# Patient Record
Sex: FEMALE | Race: WHITE | Employment: OTHER | URBAN - METROPOLITAN AREA
[De-identification: names, ages, dates, MRNs, and addresses within clinical notes are randomized per-mention and may not be internally consistent; named-entity substitution may affect disease eponyms.]

---

## 2019-04-11 PROBLEM — E44.0 MODERATE PROTEIN-CALORIE MALNUTRITION (HCC): Status: ACTIVE | Noted: 2019-04-11

## 2019-04-11 PROBLEM — K92.2 GI BLEED: Status: ACTIVE | Noted: 2019-04-11

## 2019-04-12 PROBLEM — E86.0 ACUTE DEHYDRATION: Status: ACTIVE | Noted: 2019-04-12

## 2019-04-12 PROBLEM — E87.0 ACUTE HYPERNATREMIA: Status: ACTIVE | Noted: 2019-04-12

## 2019-04-12 PROBLEM — G93.41 ACUTE METABOLIC ENCEPHALOPATHY: Status: ACTIVE | Noted: 2019-04-12

## 2019-04-12 PROBLEM — F11.90 CHRONIC, CONTINUOUS USE OF OPIOIDS: Status: ACTIVE | Noted: 2019-04-12

## 2019-04-12 PROBLEM — Z72.0 TOBACCO USE: Status: ACTIVE | Noted: 2019-04-12

## 2019-04-12 PROBLEM — M41.9 SEVERE SCOLIOSIS: Status: ACTIVE | Noted: 2019-04-12

## 2019-04-12 PROBLEM — G89.4 CHRONIC PAIN SYNDROME: Status: ACTIVE | Noted: 2019-04-12

## 2019-04-12 PROBLEM — M53.9 MULTILEVEL DEGENERATIVE DISC DISEASE: Status: ACTIVE | Noted: 2019-04-12

## 2019-04-13 PROBLEM — I95.9 HYPOTENSION: Status: ACTIVE | Noted: 2019-04-13

## 2019-04-13 PROBLEM — R41.82 ALTERED MENTAL STATUS: Status: ACTIVE | Noted: 2019-04-13

## 2019-06-27 PROBLEM — G92.8 TOXIC METABOLIC ENCEPHALOPATHY: Status: ACTIVE | Noted: 2019-04-12

## 2019-06-27 PROBLEM — F11.90 OPIATE USE: Chronic | Status: ACTIVE | Noted: 2019-06-27

## 2019-08-13 PROBLEM — G93.40 ACUTE ENCEPHALOPATHY: Status: ACTIVE | Noted: 2019-04-12

## 2022-11-15 ENCOUNTER — APPOINTMENT (OUTPATIENT)
Dept: GENERAL RADIOLOGY | Age: 62
End: 2022-11-15
Payer: MEDICARE

## 2022-11-15 ENCOUNTER — HOSPITAL ENCOUNTER (EMERGENCY)
Age: 62
Discharge: HOME | End: 2022-11-15
Attending: EMERGENCY MEDICINE
Payer: MEDICARE

## 2022-11-15 ENCOUNTER — APPOINTMENT (OUTPATIENT)
Dept: CT IMAGING | Age: 62
End: 2022-11-15
Payer: MEDICARE

## 2022-11-15 VITALS
HEART RATE: 95 BPM | DIASTOLIC BLOOD PRESSURE: 100 MMHG | TEMPERATURE: 98.2 F | RESPIRATION RATE: 18 BRPM | OXYGEN SATURATION: 95 % | HEIGHT: 61 IN | WEIGHT: 140 LBS | SYSTOLIC BLOOD PRESSURE: 153 MMHG | BODY MASS INDEX: 26.43 KG/M2

## 2022-11-15 DIAGNOSIS — M25.552 LEFT HIP PAIN: Primary | ICD-10-CM
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
## 2022-11-15 DIAGNOSIS — S72.002A LEFT DISPLACED FEMORAL NECK FRACTURE (HCC): ICD-10-CM

## 2022-11-15 PROCEDURE — 96372 THER/PROPH/DIAG INJ SC/IM: CPT

## 2022-11-15 PROCEDURE — 6360000002 HC RX W HCPCS: Performed by: EMERGENCY MEDICINE

## 2022-11-15 PROCEDURE — 73501 X-RAY EXAM HIP UNI 1 VIEW: CPT

## 2022-11-15 PROCEDURE — 6360000002 HC RX W HCPCS

## 2022-11-15 PROCEDURE — 99284 EMERGENCY DEPT VISIT MOD MDM: CPT

## 2022-11-15 PROCEDURE — 96374 THER/PROPH/DIAG INJ IV PUSH: CPT

## 2022-11-15 RX ORDER — MORPHINE SULFATE 4 MG/ML
8 INJECTION, SOLUTION INTRAMUSCULAR; INTRAVENOUS ONCE
Status: COMPLETED | OUTPATIENT
Start: 2022-11-15 | End: 2022-11-15

## 2022-11-15 RX ORDER — ACETAMINOPHEN 325 MG/1
650 TABLET ORAL ONCE
Status: DISCONTINUED | OUTPATIENT
Start: 2022-11-15 | End: 2022-11-15 | Stop reason: HOSPADM

## 2022-11-15 RX ORDER — MORPHINE SULFATE 8 MG/ML
8 INJECTION, SOLUTION INTRAMUSCULAR; INTRAVENOUS ONCE
Status: COMPLETED | OUTPATIENT
Start: 2022-11-15 | End: 2022-11-15

## 2022-11-15 RX ORDER — FENTANYL CITRATE 50 UG/ML
50 INJECTION, SOLUTION INTRAMUSCULAR; INTRAVENOUS ONCE
Status: DISCONTINUED | OUTPATIENT
Start: 2022-11-15 | End: 2022-11-15

## 2022-11-15 RX ADMIN — MORPHINE SULFATE 8 MG: 8 INJECTION, SOLUTION INTRAMUSCULAR; INTRAVENOUS at 19:47

## 2022-11-15 RX ADMIN — MORPHINE SULFATE 8 MG: 4 INJECTION, SOLUTION INTRAMUSCULAR; INTRAVENOUS at 16:45

## 2022-11-15 ASSESSMENT — ENCOUNTER SYMPTOMS
SORE THROAT: 0
NAUSEA: 0
TROUBLE SWALLOWING: 0
VOMITING: 0
DIARRHEA: 0
COUGH: 0
PHOTOPHOBIA: 0
ABDOMINAL PAIN: 0
SHORTNESS OF BREATH: 0
WHEEZING: 0
VOICE CHANGE: 0

## 2022-11-15 ASSESSMENT — PAIN DESCRIPTION - ORIENTATION
ORIENTATION: LEFT
ORIENTATION: LEFT

## 2022-11-15 ASSESSMENT — PAIN - FUNCTIONAL ASSESSMENT
PAIN_FUNCTIONAL_ASSESSMENT: 0-10
PAIN_FUNCTIONAL_ASSESSMENT: 0-10

## 2022-11-15 ASSESSMENT — PAIN DESCRIPTION - LOCATION
LOCATION: KNEE
LOCATION: HIP;KNEE

## 2022-11-15 ASSESSMENT — PAIN SCALES - GENERAL
PAINLEVEL_OUTOF10: 10

## 2022-11-15 NOTE — DISCHARGE INSTRUCTIONS
Return to ED if any worsening symptoms or alarming changes occur.   Recommend orthopaedic for left femoral neck fracture   Follow-up with [facility]

## 2022-11-15 NOTE — PROGRESS NOTES
[facility]    Referral received. [facility] had called [facility] last night and again this morning. Hannah Salazar states they are active with [facility]. This nurse call [facility] and they could not find patient in their system. thinkingphones and she confirmed they are [facility]. She thought coming into the hospital would be an automatic revocation. This nurse explained that is not the case. Reviewed home medications being 1ml (20mg) of morphine Q1, Dilaudid tabs 20mg Q3, and 200mcg of fentanyl patches. Explained this nurse will call [facility] hospice again and the process of transferring hospice services verse revocation. Discussed ER giving medications for comfort. Hannah Salazar is hopeful for hospice house for pain management and then back home with hospice. Spoke with Francisca Cook at [facility]. She was very familiar with patient. Spoke with [facility] management and obtained information needed to transfer hospice services. Called Francisca Cook back and she stated she would fax information needed. Fax never came through. Called jose back and she left for the night. [facility] unable to take patient into our care until we receive the documents. Called and updated  in [facility] and ER physician. Called Hannah Salazar and explained the situation to her. Honey plans to take Kristofer Livers back home and would like [facility] to see tomorrow once documents are received and send her to hospice house. Updated [facility] management.   Electronically signed by Jaimee Sadler RN on 11/15/2022 at 6:29 PM        Electronically signed by Jaimee Sadler RN on 11/15/2022 at 4:36 PM   [phone]

## 2022-11-15 NOTE — ED NOTES
PT refusing all labs and imaging stating she wants a hospice consult with [facility].      Nadine Ang, RN  11/15/22 8151

## 2022-11-16 PROBLEM — S72.002A DISPLACED FRACTURE OF LEFT FEMORAL NECK (HCC): Status: ACTIVE | Noted: 2022-11-16

## 2022-11-16 NOTE — ED PROVIDER NOTES
60y.o. year old female presenting to the emergency room with concerns of fall left hip pain. No LOC, no head injury. Reports that symptom's onset this morning . Worsen with  movement. Improves withnothing. Severity of 10, with radiation down left leg. Symptoms are constant in timing. Symptoms described as fall while trying to transfer from bed to wheelchair. associated symptoms of left knee and hip pain. Patient externally rotated on initial exam. Hx of lung cancer with metastasis to bone, currently at [facility]. Chief Complaint   Patient presents with    Sofía Jesus while transfering to a wheelchair. She reports she injured her left hip and knee. She states she does not want any testing done but wants a hospice consult with [facility]. Hip Pain       Review of Systems   Constitutional:  Negative for chills, fatigue and fever. HENT:  Negative for congestion, sore throat, trouble swallowing and voice change. Eyes:  Negative for photophobia and visual disturbance. Respiratory:  Negative for cough, shortness of breath and wheezing. Cardiovascular:  Negative for chest pain and palpitations. Gastrointestinal:  Negative for abdominal pain, diarrhea, nausea and vomiting. Genitourinary:  Negative for dysuria, frequency, hematuria and urgency. Musculoskeletal:  Positive for gait problem. Negative for arthralgias, neck pain and neck stiffness. Skin:  Negative for pallor and wound. Neurological:  Negative for dizziness, syncope and headaches. Psychiatric/Behavioral:  Negative for behavioral problems and confusion. The patient is nervous/anxious. Physical Exam  Vitals reviewed. Constitutional:       General: She is not in acute distress. HENT:      Head: Normocephalic. Right Ear: External ear normal.      Left Ear: External ear normal.      Mouth/Throat:      Pharynx: Oropharynx is clear. Eyes:      General:         Right eye: No discharge. Left eye: No discharge. Conjunctiva/sclera: Conjunctivae normal.   Cardiovascular:      Rate and Rhythm: Normal rate and regular rhythm. Pulses: Normal pulses. Dorsalis pedis pulses are 2+ on the right side and 2+ on the left side. Posterior tibial pulses are 2+ on the right side and 2+ on the left side. Heart sounds: No friction rub. Pulmonary:      Effort: Pulmonary effort is normal. No respiratory distress. Breath sounds: No stridor. No rhonchi. Abdominal:      General: There is no distension. Tenderness: There is no abdominal tenderness. There is no guarding or rebound. Musculoskeletal:         General: Tenderness and deformity present. Cervical back: Normal range of motion and neck supple. No rigidity or tenderness. Right hip: Deformity, tenderness and bony tenderness present. Decreased range of motion. Right foot: Normal pulse. Skin:     Coloration: Skin is not jaundiced. Findings: Bruising and erythema present. Neurological:      Mental Status: She is alert and oriented to person, place, and time. Sensory: No sensory deficit. Motor: No weakness. Psychiatric:         Mood and Affect: Mood normal.         Behavior: Behavior normal.        Procedures     XR HIP 1 VW W PELVIS LEFT   Final Result   Displaced left femoral neck fracture. MDM  Number of Diagnoses or Management Options  Fall, initial encounter  Left displaced femoral neck fracture ([address]  Left hip pain  Diagnosis management comments: 60year old female presenting to the ER with complaints of fall, left hip pain. - head trauma, - LOC. Blood thinners and hospice for metastatic lung cancer with bone mets. Patient agreeable to x-ray. Does not wish for any further testing. X-ray demonstrates displaced left femoral neck fracture. Spoke to patient and POA, offered orthopedic however patient not interested in any further medical treatment at this time. Patient would like to have hospice consult from [facility]. [facility] unable to place patient until the morning. Patient offered admission, however unwilling to be admitted at this time. Patient also unwilling to wait for ambulance, will go home by private vehicle and be picked up by hospice house in the morning. Patient alert and oriented x4 and understands the risks of leaving. Patient does not wish for any further medical treatment at this time. Patient does request pain medication prior to discharge. Patient's son to  patient. We will plan to discharge at this time with follow-up with [facility] tomorrow. Information for orthopedic given if patient chooses. ED Course as of 11/15/22 2305   Tue Nov 15, 2022   1841 Spoke to patient and JEANCARLOS Salazar and discussed left displaced femoral neck fracture, discussed orthopedic consult and blood work however patient and POA do not wish to pursue any further medical treatment at this time and would like to continue hospice measures. They would like to pursue [facility] and and [address] [place] [address]. [facility] unable to get paperwork today and will only be able to take patient in the morning. Patient and POA, offered admission however patient unwilling to stay at this time, would prefer to be discharged home. [facility] will  patient from her home in the morning. Patient unwilling to wait for ambulance and will go home by private vehicle with son. Patient request pain medications prior to leaving.   Patient is alert and oriented x4, and understanding risks would still like to be discharged with plan for hospice at hospice  the [facility] tomorrow [REBEKAH]      ED Course User Index  [REBEKAH] Kamilla Cruz DO        ED Course as of 11/15/22 2305   Tue Nov 15, 2022   Matilde 1979 Spoke to patient and JEANCARLOS Salazar and discussed left displaced femoral neck fracture, discussed orthopedic consult and blood work however patient and POA do not wish to pursue any further medical treatment at this time and would like to continue hospice measures. They would like to pursue hospice [facility] and and [address] [place] [address]. [facility] unable to get paperwork today and will only be able to take patient in the morning. Patient and POA, offered admission however patient unwilling to stay at this time, would prefer to be discharged home. [facility] will  patient from her home in the morning. Patient unwilling to wait for ambulance and will go home by private vehicle with son. Patient request pain medications prior to leaving. Patient is alert and oriented x4, and understanding risks would still like to be discharged with plan for hospice at [facility] tomorrow [REBEKAH]      ED Course User Index  [REBEKAH] Ho Souza DO       --------------------------------------------- PAST HISTORY ---------------------------------------------  Past Medical History:  has a past medical history of Opiate use, PRES (posterior reversible encephalopathy syndrome), Scoliosis, and Spinal stenosis. Past Surgical History:  has a past surgical history that includes Knee arthroscopy; Upper gastrointestinal endoscopy (N/A, 4/14/2019); Colonoscopy (N/A, 4/14/2019); and CT NEEDLE BIOPSY LUNG PERCUTANEOUS (5/12/2022). Social History:  reports that she has been smoking cigarettes. She has a 30.00 pack-year smoking history. She has never used smokeless tobacco. She reports that she does not drink alcohol and does not use drugs. Family History: family history includes Heart Surgery in her father. The patients home medications have been reviewed. Allergies: Lyrica [pregabalin]    -------------------------------------------------- RESULTS -------------------------------------------------  Labs:  No results found for this visit on 11/15/22.     Radiology:  XR HIP 1 VW W PELVIS LEFT   Final Result   Displaced left femoral neck fracture.             ------------------------- NURSING NOTES AND VITALS REVIEWED ---------------------------  Date / Time Roomed:  11/15/2022  3:01 PM  ED Bed Assignment:  01/01    The nursing notes within the ED encounter and vital signs as below have been reviewed. BP (!) 153/100   Pulse 95   Temp 98.2 °F (36.8 °C) (Oral)   Resp 18   Ht 5' 1\" (1.549 m)   Wt 140 lb (63.5 kg)   SpO2 95%   BMI 26.45 kg/m²   Oxygen Saturation Interpretation: Normal      ------------------------------------------ PROGRESS NOTES ------------------------------------------  I have spoken with the patient and discussed todays results, in addition to providing specific details for the plan of care and counseling regarding the diagnosis and prognosis. Their questions are answered at this time and they are agreeable with the plan. I discussed at length with them reasons for immediate return here for re evaluation. They will followup with primary care by calling their office tomorrow. --------------------------------- ADDITIONAL PROVIDER NOTES ---------------------------------  At this time the patient is without objective evidence of an acute process requiring hospitalization or inpatient management. They have remained hemodynamically stable throughout their entire ED visit and are stable for discharge with outpatient follow-up. The plan has been discussed in detail and they are aware of the specific conditions for emergent return, as well as the importance of follow-up. Discharge Medication List as of 11/15/2022  7:16 PM          Diagnosis:  1. Left hip pain    2. Left displaced femoral neck fracture ([address]    3. Fall, initial encounter        Disposition:  Patient's disposition: Discharge to home  Patient's condition is stable. Attending was present and available throughout encounter including all critical portions;  See Attending Note/Attestation for Final Plan       Ho Souza, DO  Resident  11/15/22 4143

## 2022-11-19 ENCOUNTER — HOSPITAL ENCOUNTER (INPATIENT)
Age: 62
LOS: 2 days | Discharge: HOSPICE | DRG: 522 | End: 2022-11-21
Attending: EMERGENCY MEDICINE | Admitting: INTERNAL MEDICINE
Payer: MEDICARE

## 2022-11-19 DIAGNOSIS — S72.002A CLOSED FRACTURE OF LEFT HIP, INITIAL ENCOUNTER (HCC): Primary | ICD-10-CM

## 2022-11-19 LAB
ABO/RH: NORMAL
ANION GAP SERPL CALCULATED.3IONS-SCNC: 12 MMOL/L (ref 7–16)
ANTIBODY SCREEN: NORMAL
BASOPHILS ABSOLUTE: 0.01 E9/L (ref 0–0.2)
BASOPHILS RELATIVE PERCENT: 0.1 % (ref 0–2)
BUN BLDV-MCNC: 25 MG/DL (ref 6–23)
CALCIUM SERPL-MCNC: 9.1 MG/DL (ref 8.6–10.2)
CHLORIDE BLD-SCNC: 96 MMOL/L (ref 98–107)
CO2: 32 MMOL/L (ref 22–29)
CREAT SERPL-MCNC: 0.9 MG/DL (ref 0.5–1)
EOSINOPHILS ABSOLUTE: 0 E9/L (ref 0.05–0.5)
EOSINOPHILS RELATIVE PERCENT: 0 % (ref 0–6)
GFR SERPL CREATININE-BSD FRML MDRD: >60 ML/MIN/1.73
GLUCOSE BLD-MCNC: 114 MG/DL (ref 74–99)
HCT VFR BLD CALC: 44.4 % (ref 34–48)
HEMOGLOBIN: 13.7 G/DL (ref 11.5–15.5)
IMMATURE GRANULOCYTES #: 0.04 E9/L
IMMATURE GRANULOCYTES %: 0.4 % (ref 0–5)
LYMPHOCYTES ABSOLUTE: 1.29 E9/L (ref 1.5–4)
LYMPHOCYTES RELATIVE PERCENT: 14.1 % (ref 20–42)
MAGNESIUM: 2.3 MG/DL (ref 1.6–2.6)
MCH RBC QN AUTO: 30.2 PG (ref 26–35)
MCHC RBC AUTO-ENTMCNC: 30.9 % (ref 32–34.5)
MCV RBC AUTO: 97.8 FL (ref 80–99.9)
MONOCYTES ABSOLUTE: 0.2 E9/L (ref 0.1–0.95)
MONOCYTES RELATIVE PERCENT: 2.2 % (ref 2–12)
NEUTROPHILS ABSOLUTE: 7.61 E9/L (ref 1.8–7.3)
NEUTROPHILS RELATIVE PERCENT: 83.2 % (ref 43–80)
PDW BLD-RTO: 15.7 FL (ref 11.5–15)
PLATELET # BLD: 263 E9/L (ref 130–450)
PMV BLD AUTO: 9.4 FL (ref 7–12)
POTASSIUM REFLEX MAGNESIUM: 3.2 MMOL/L (ref 3.5–5)
RBC # BLD: 4.54 E12/L (ref 3.5–5.5)
SODIUM BLD-SCNC: 140 MMOL/L (ref 132–146)
WBC # BLD: 9.2 E9/L (ref 4.5–11.5)

## 2022-11-19 PROCEDURE — 99222 1ST HOSP IP/OBS MODERATE 55: CPT | Performed by: FAMILY MEDICINE

## 2022-11-19 PROCEDURE — 86900 BLOOD TYPING SEROLOGIC ABO: CPT

## 2022-11-19 PROCEDURE — 85025 COMPLETE CBC W/AUTO DIFF WBC: CPT

## 2022-11-19 PROCEDURE — 2580000003 HC RX 258

## 2022-11-19 PROCEDURE — 86901 BLOOD TYPING SEROLOGIC RH(D): CPT

## 2022-11-19 PROCEDURE — 6360000002 HC RX W HCPCS: Performed by: INTERNAL MEDICINE

## 2022-11-19 PROCEDURE — 1200000000 HC SEMI PRIVATE

## 2022-11-19 PROCEDURE — 83735 ASSAY OF MAGNESIUM: CPT

## 2022-11-19 PROCEDURE — 93005 ELECTROCARDIOGRAM TRACING: CPT | Performed by: EMERGENCY MEDICINE

## 2022-11-19 PROCEDURE — 99285 EMERGENCY DEPT VISIT HI MDM: CPT

## 2022-11-19 PROCEDURE — 99223 1ST HOSP IP/OBS HIGH 75: CPT | Performed by: STUDENT IN AN ORGANIZED HEALTH CARE EDUCATION/TRAINING PROGRAM

## 2022-11-19 PROCEDURE — 80048 BASIC METABOLIC PNL TOTAL CA: CPT

## 2022-11-19 PROCEDURE — 6370000000 HC RX 637 (ALT 250 FOR IP)

## 2022-11-19 PROCEDURE — 86850 RBC ANTIBODY SCREEN: CPT

## 2022-11-19 RX ORDER — MIRTAZAPINE 15 MG/1
15 TABLET, FILM COATED ORAL NIGHTLY
Status: DISCONTINUED | OUTPATIENT
Start: 2022-11-19 | End: 2022-11-21 | Stop reason: HOSPADM

## 2022-11-19 RX ORDER — POTASSIUM CHLORIDE 20 MEQ/1
40 TABLET, EXTENDED RELEASE ORAL ONCE
Status: COMPLETED | OUTPATIENT
Start: 2022-11-19 | End: 2022-11-19

## 2022-11-19 RX ORDER — POLYETHYLENE GLYCOL 3350 17 G/17G
17 POWDER, FOR SOLUTION ORAL DAILY PRN
Status: DISCONTINUED | OUTPATIENT
Start: 2022-11-19 | End: 2022-11-21 | Stop reason: HOSPADM

## 2022-11-19 RX ORDER — MORPHINE SULFATE 2 MG/ML
2 INJECTION, SOLUTION INTRAMUSCULAR; INTRAVENOUS
Status: DISCONTINUED | OUTPATIENT
Start: 2022-11-19 | End: 2022-11-21 | Stop reason: HOSPADM

## 2022-11-19 RX ORDER — SODIUM CHLORIDE 9 MG/ML
INJECTION, SOLUTION INTRAVENOUS PRN
Status: DISCONTINUED | OUTPATIENT
Start: 2022-11-19 | End: 2022-11-21 | Stop reason: HOSPADM

## 2022-11-19 RX ORDER — ACETAMINOPHEN 325 MG/1
650 TABLET ORAL EVERY 6 HOURS PRN
Status: DISCONTINUED | OUTPATIENT
Start: 2022-11-19 | End: 2022-11-21 | Stop reason: HOSPADM

## 2022-11-19 RX ORDER — SENNA AND DOCUSATE SODIUM 50; 8.6 MG/1; MG/1
2 TABLET, FILM COATED ORAL 2 TIMES DAILY
Status: DISCONTINUED | OUTPATIENT
Start: 2022-11-19 | End: 2022-11-21 | Stop reason: HOSPADM

## 2022-11-19 RX ORDER — METOPROLOL SUCCINATE 50 MG/1
50 TABLET, EXTENDED RELEASE ORAL DAILY
Status: DISCONTINUED | OUTPATIENT
Start: 2022-11-20 | End: 2022-11-21 | Stop reason: HOSPADM

## 2022-11-19 RX ORDER — NICOTINE 21 MG/24HR
1 PATCH, TRANSDERMAL 24 HOURS TRANSDERMAL DAILY
Status: DISCONTINUED | OUTPATIENT
Start: 2022-11-19 | End: 2022-11-21 | Stop reason: HOSPADM

## 2022-11-19 RX ORDER — SODIUM CHLORIDE 0.9 % (FLUSH) 0.9 %
5-40 SYRINGE (ML) INJECTION EVERY 12 HOURS SCHEDULED
Status: DISCONTINUED | OUTPATIENT
Start: 2022-11-19 | End: 2022-11-21 | Stop reason: HOSPADM

## 2022-11-19 RX ORDER — ONDANSETRON 2 MG/ML
4 INJECTION INTRAMUSCULAR; INTRAVENOUS EVERY 6 HOURS PRN
Status: DISCONTINUED | OUTPATIENT
Start: 2022-11-19 | End: 2022-11-21 | Stop reason: HOSPADM

## 2022-11-19 RX ORDER — SODIUM CHLORIDE 0.9 % (FLUSH) 0.9 %
5-40 SYRINGE (ML) INJECTION PRN
Status: DISCONTINUED | OUTPATIENT
Start: 2022-11-19 | End: 2022-11-21 | Stop reason: HOSPADM

## 2022-11-19 RX ORDER — METHADONE HYDROCHLORIDE 5 MG/1
5 TABLET ORAL EVERY 8 HOURS SCHEDULED
Status: DISCONTINUED | OUTPATIENT
Start: 2022-11-19 | End: 2022-11-21 | Stop reason: HOSPADM

## 2022-11-19 RX ORDER — ONDANSETRON 4 MG/1
4 TABLET, ORALLY DISINTEGRATING ORAL EVERY 8 HOURS PRN
Status: DISCONTINUED | OUTPATIENT
Start: 2022-11-19 | End: 2022-11-21 | Stop reason: HOSPADM

## 2022-11-19 RX ORDER — ACETAMINOPHEN 650 MG/1
650 SUPPOSITORY RECTAL EVERY 6 HOURS PRN
Status: DISCONTINUED | OUTPATIENT
Start: 2022-11-19 | End: 2022-11-21 | Stop reason: HOSPADM

## 2022-11-19 RX ORDER — QUETIAPINE FUMARATE 25 MG/1
25 TABLET, FILM COATED ORAL EVERY 6 HOURS PRN
Status: DISCONTINUED | OUTPATIENT
Start: 2022-11-19 | End: 2022-11-21 | Stop reason: HOSPADM

## 2022-11-19 RX ADMIN — METHADONE HYDROCHLORIDE 5 MG: 5 TABLET ORAL at 18:36

## 2022-11-19 RX ADMIN — METHADONE HYDROCHLORIDE 5 MG: 5 TABLET ORAL at 21:37

## 2022-11-19 RX ADMIN — ALTEPLASE 2 MG: 2.2 INJECTION, POWDER, LYOPHILIZED, FOR SOLUTION INTRAVENOUS at 18:26

## 2022-11-19 RX ADMIN — SODIUM CHLORIDE, PRESERVATIVE FREE 10 ML: 5 INJECTION INTRAVENOUS at 21:41

## 2022-11-19 RX ADMIN — POTASSIUM CHLORIDE 40 MEQ: 1500 TABLET, EXTENDED RELEASE ORAL at 18:37

## 2022-11-19 RX ADMIN — DOCUSATE SODIUM 50 MG AND SENNOSIDES 8.6 MG 2 TABLET: 8.6; 5 TABLET, FILM COATED ORAL at 21:37

## 2022-11-19 RX ADMIN — MIRTAZAPINE 15 MG: 15 TABLET, FILM COATED ORAL at 21:38

## 2022-11-19 ASSESSMENT — ENCOUNTER SYMPTOMS
PHOTOPHOBIA: 0
CONSTIPATION: 0
NAUSEA: 0
DIARRHEA: 0
VOMITING: 0
CHEST TIGHTNESS: 0
WHEEZING: 0
TROUBLE SWALLOWING: 0
BLOOD IN STOOL: 0

## 2022-11-19 NOTE — ED PROVIDER NOTES
HPI:  11/19/22,   Time: 2:44 PM WANG Espino is a 60 y.o. female presenting to the ED for patient was sent from the hospice house for repair of a left hip fracture that is secondary to a pathologic fracture related to metastatic lung cancer is going to be repaired by Dr. Maggie Arellano, beginning a few days ago. The complaint has been persistent, severe in severity, and worsened by movement of her left leg. ROS:   Pertinent positives and negatives are stated within HPI, all other systems reviewed and are negative.  --------------------------------------------- PAST HISTORY ---------------------------------------------  Past Medical History:  has a past medical history of Displaced fracture of left femoral neck ([address] Opiate use, PRES (posterior reversible encephalopathy syndrome), Scoliosis, and Spinal stenosis. Past Surgical History:  has a past surgical history that includes Knee arthroscopy; Upper gastrointestinal endoscopy (N/A, 4/14/2019); Colonoscopy (N/A, 4/14/2019); and CT NEEDLE BIOPSY LUNG PERCUTANEOUS (5/12/2022). Social History:  reports that she has been smoking cigarettes. She has a 30.00 pack-year smoking history. She has never used smokeless tobacco. She reports that she does not drink alcohol and does not use drugs. Family History: family history includes Heart Surgery in her father. The patients home medications have been reviewed.     Allergies: Lyrica [pregabalin]    -------------------------------------------------- RESULTS -------------------------------------------------  All laboratory and radiology results have been personally reviewed by myself   LABS:  Results for orders placed or performed during the hospital encounter of 11/19/22   CBC with Auto Differential   Result Value Ref Range    WBC 9.2 4.5 - 11.5 E9/L    RBC 4.54 3.50 - 5.50 E12/L    Hemoglobin 13.7 11.5 - 15.5 g/dL    Hematocrit 44.4 34.0 - 48.0 %    MCV 97.8 80.0 - 99.9 fL    MCH 30.2 26.0 - 35.0 pg MCHC 30.9 (L) 32.0 - 34.5 %    RDW 15.7 (H) 11.5 - 15.0 fL    Platelets 913 857 - 837 E9/L    MPV 9.4 7.0 - 12.0 fL    Neutrophils % 83.2 (H) 43.0 - 80.0 %    Immature Granulocytes % 0.4 0.0 - 5.0 %    Lymphocytes % 14.1 (L) 20.0 - 42.0 %    Monocytes % 2.2 2.0 - 12.0 %    Eosinophils % 0.0 0.0 - 6.0 %    Basophils % 0.1 0.0 - 2.0 %    Neutrophils Absolute 7.61 (H) 1.80 - 7.30 E9/L    Immature Granulocytes # 0.04 E9/L    Lymphocytes Absolute 1.29 (L) 1.50 - 4.00 E9/L    Monocytes Absolute 0.20 0.10 - 0.95 E9/L    Eosinophils Absolute 0.00 (L) 0.05 - 0.50 E9/L    Basophils Absolute 0.01 0.00 - 0.20 E9/L       RADIOLOGY:  Interpreted by Radiologist.  No orders to display       ------------------------- NURSING NOTES AND VITALS REVIEWED ---------------------------   The nursing notes within the ED encounter and vital signs as below have been reviewed. /63   Pulse 72   Temp 98 °F (36.7 °C)   Resp 16   Ht 5' 1\" (1.549 m)   Wt 140 lb (63.5 kg)   SpO2 92%   BMI 26.45 kg/m²   Oxygen Saturation Interpretation: Normal      ---------------------------------------------------PHYSICAL EXAM--------------------------------------      Constitutional/General: Alert and oriented x3, well appearing, non toxic in NAD  Head: NC/AT  Eyes: PERRL, EOMI  Mouth: Oropharynx clear, handling secretions, no trismus  Neck: Supple, full ROM, no meningeal signs  Pulmonary: Lungs clear to auscultation bilaterally, no wheezes, rales, or rhonchi. Not in respiratory distress  Cardiovascular:  Regular rate and rhythm, no murmurs, gallops, or rubs. 2+ distal pulses  Abdomen: Soft, non tender, non distended,   Extremities: Moves all extremities x 4. Warm and well perfused shortened externally rotated tender left lower extremity  Skin: warm and dry without rash  Neurologic: GCS 15,  Psych: Normal Affect    EKG: This EKG is signed and interpreted by me.     Rate: 78  Rhythm: Sinus  Interpretation: no acute changes  Comparison: no previous EKG available      ------------------------------ ED COURSE/MEDICAL DECISION MAKING----------------------  Medications   sodium chloride flush 0.9 % injection 5-40 mL (has no administration in time range)   sodium chloride flush 0.9 % injection 5-40 mL (has no administration in time range)   0.9 % sodium chloride infusion (has no administration in time range)   ondansetron (ZOFRAN-ODT) disintegrating tablet 4 mg (has no administration in time range)     Or   ondansetron (ZOFRAN) injection 4 mg (has no administration in time range)   polyethylene glycol (GLYCOLAX) packet 17 g (has no administration in time range)   acetaminophen (TYLENOL) tablet 650 mg (has no administration in time range)     Or   acetaminophen (TYLENOL) suppository 650 mg (has no administration in time range)   nicotine (NICODERM CQ) 14 MG/24HR 1 patch (has no administration in time range)   methadone (DOLOPHINE) tablet 5 mg (has no administration in time range)   morphine (PF) injection 2 mg (has no administration in time range)   mirtazapine (REMERON) tablet 15 mg (has no administration in time range)   metoprolol succinate (TOPROL XL) extended release tablet 50 mg (has no administration in time range)   QUEtiapine (SEROQUEL) tablet 25 mg (has no administration in time range)   sennosides-docusate sodium (SENOKOT-S) 8.6-50 MG tablet 2 tablet (has no administration in time range)         Medical Decision Making:    Patient was discussed with Dr. Johnnie Goddard and discussed with Dr. Des Pa and will be admitted to the hospitalist service with plans for ORIF by Dr. Johnnie Goddard    Counseling: The emergency provider has spoken with the patient and discussed todays results, in addition to providing specific details for the plan of care and counseling regarding the diagnosis and prognosis.   Questions are answered at this time and they are agreeable with the plan.      --------------------------------- IMPRESSION AND DISPOSITION ---------------------------------    IMPRESSION  1.  Closed fracture of left hip, initial encounter ([facility]        DISPOSITION  Disposition: Admit to med/surg floor  Patient condition is serious                  Marcus Darnell MD  11/19/22 3174

## 2022-11-19 NOTE — CONSULTS
Department of Orthopedic Surgery  Attending Consult Note        Reason for Consult:  left hip pain  Requesting Physician:  Dr. Court Redd:  Left hip pain     History Obtained From:  patient    HISTORY OF PRESENT ILLNESS:                The patient is a 60 y.o. female who presents with left femoral neck fracture. She fell on the 15th and was seen in the emergency department. She was discharged from the hospital to hospice house. She currently has metastatic lung cancer. Palliative care was going to be pursued however she is in extreme pain with any transfers or movement in bed. For this reason I was contacted today about surgical management. I recommended presentation to the emergency department to discuss operative intervention. Patient's goals are comfort with transfers so she can go home. She denies any other injuries. She is excited to get her hip feeling better. .    Past Medical History:        Diagnosis Date    Displaced fracture of left femoral neck ([address] 11/16/2022    Opiate use 6/27/2019    PRES (posterior reversible encephalopathy syndrome) 6/27/2019    Clinically improving s/p acute toxic-metabolic encephalopathy, hypotension, caloric malnutrition state    Scoliosis     Spinal stenosis        Past Surgical History:        Procedure Laterality Date    COLONOSCOPY N/A 4/14/2019    COLONOSCOPY DIAGNOSTIC performed by Suni Marroquin MD at [address] [facility]  5/12/2022    CT NEEDLE BIOPSY LUNG PERCUTANEOUS 5/12/2022 [facility] CT    KNEE ARTHROSCOPY      UPPER GASTROINTESTINAL ENDOSCOPY N/A 4/14/2019    EGD ESOPHAGOGASTRODUODENOSCOPY performed by Suni Marroquin MD at [address] [place] [address]       Current Medications:   No current facility-administered medications for this encounter. Allergies:  Lyrica [pregabalin]    Social History:   TOBACCO:   reports that she has been smoking cigarettes. She has a 30.00 pack-year smoking history.  She has never used smokeless tobacco.  ETOH:   reports no history of alcohol use. DRUGS:   reports no history of drug use. Family History:       Problem Relation Age of Onset    Heart Surgery Father        REVIEW OF SYSTEMS:       Constitutional:  Negative for weight loss, fevers, chills, fatigue  Cardiovascular: Negative for chest pain, palpitations  Pulmonary: Negative for shortness of breath, labored breathing, cough  GI: negative for abdominal pain, nausea, vomitting   MSK: per HPI  Skin: negative for rash, open wounds    All other systems reviewed and are negative         PHYSICAL EXAM:      Vitals:    11/19/22 1419 11/19/22 1425   BP:  127/63   Pulse:  72   Resp:  16   Temp:  98 °F (36.7 °C)   SpO2:  92%   Weight: 140 lb (63.5 kg)    Height: 5' 1\" (1.549 m)        Height: [unfilled]  Weight: [unfilled]  BMI:  Body mass index is 26.45 kg/m². General: The patient is alert and oriented x 3, appears to be stated age and in no distress. HEENT: head is normocephalic, atraumatic. EOMI. Neck: supple, trachea midline, no thyromegaly   Cardiovascular: peripheral pulses palpable. Normal Capillary refill   Respiratory: breathing unlabored, chest expansion symmetric   GI: abdomen NT/ND. No masses   Skin: no rash, no open wounds, no erythema  Psych: normal affect; mood stable  MSK:  Left hip: Circumferentially skin is intact without any wounds or lesions. Her hip is held in a shortened and externally rotated position. Any motion of her hip produces severe pain. Her thigh is soft and compressible. She is atraumatic around her knee. Calf soft and supple. She has 5 out of 5 strength with ankle plantar and dorsiflexion. Sensation light touch is intact in sural saphenous superficial peroneal deep peroneal and tibial nerve distribution.   Foot warm and well-perfused        DATA:      CBC:   Lab Results   Component Value Date/Time    WBC 6.3 05/12/2022 07:20 AM    RBC 4.56 05/12/2022 07:20 AM    HGB 14.4 05/12/2022 07:20 AM HCT 44.4 05/12/2022 07:20 AM    MCV 97.4 05/12/2022 07:20 AM    MCH 31.6 05/12/2022 07:20 AM    MCHC 32.4 05/12/2022 07:20 AM    RDW 13.6 05/12/2022 07:20 AM     05/12/2022 07:20 AM    MPV 9.4 05/12/2022 07:20 AM     BMP:    Lab Results   Component Value Date/Time     05/12/2022 07:20 AM    K 4.3 05/12/2022 07:20 AM    K 3.2 04/14/2019 05:16 AM    CL 99 05/12/2022 07:20 AM    CO2 30 05/12/2022 07:20 AM    BUN 11 05/12/2022 07:20 AM    LABALBU 4.7 12/17/2019 07:20 PM    CREATININE 1.1 05/12/2022 07:20 AM    CALCIUM 9.6 05/12/2022 07:20 AM    GFRAA >60 05/12/2022 07:20 AM    LABGLOM 50 05/12/2022 07:20 AM    GLUCOSE 97 05/12/2022 07:20 AM     PT/INR:    Lab Results   Component Value Date/Time    PROTIME 10.9 05/12/2022 07:20 AM    INR 1.0 05/12/2022 07:20 AM     PTT:  No results found for: APTT, PTT[APTT}  U/A:    Lab Results   Component Value Date/Time    COLORU Yellow 08/09/2019 08:11 PM    PHUR 7.0 08/09/2019 08:11 PM    WBCUA 0-1 08/09/2019 08:11 PM    RBCUA 2-5 08/09/2019 08:11 PM    MUCUS Present 08/09/2019 08:11 PM    BACTERIA MODERATE 08/09/2019 08:11 PM    CLARITYU Clear 08/09/2019 08:11 PM    SPECGRAV 1.015 08/09/2019 08:11 PM    LEUKOCYTESUR Negative 08/09/2019 08:11 PM    UROBILINOGEN 0.2 08/09/2019 08:11 PM    BILIRUBINUR Negative 08/09/2019 08:11 PM    BLOODU Negative 08/09/2019 08:11 PM    GLUCOSEU Negative 08/09/2019 08:11 PM    AMORPHOUS MANY 08/09/2019 08:11 PM       Radiology Review: AP pelvis reviewed demonstrate a displaced subcapital femoral neck fracture    Other diagnostic test: None required    IMPRESSION/RECOMMENDATIONS:    60y.o. year old female with displaced left subcapital femoral neck fracture  -Admitted to medicine service  -Had a long discussion with the patient about operative intervention. I explained to her that if she is seeking pain control and to regain her independence so she can go home on hospice care her best option is a hemiarthroplasty.   I think all of her fracture related pain was immediately go away after surgery. Risk benefits and alternatives of surgery were discussed. She does understand that there is a risk of blood loss and infection along with hip dislocation. She will also have surgical site pain from our dissection. I still believe it is in her best interest if she is hoping to go home and gain some level of independence during her comfort care to pursue operative intervention. She is agreeable and we are going to move forward with left hip hemiarthroplasty. Internal medicine will admit the patient for medical clearance. She will be n.p.o. at midnight. Consent will be ordered.   Surgery tomorrow if cleared    Zachary Corrigan DO   Orthopaedic Surgery   11/19/2022  3:43 PM

## 2022-11-19 NOTE — H&P
[place] [id number]  Resident History and Physical      CHIEF COMPLAINT:    Chief Complaint   Patient presents with    Pre-op Exam     Left hip fracture. Sent for Hemiarthroplasty with Dr. Olga Juarez tomorrow. Patient of hospice house. History of Present Illness:   Gustavo Evans  is a 60 y.o. female with a past medical history of hypertension, lung cancer with metastasis to bone who presents today with chief complaint of preop exam.  Patient had a mechanical fall on Tuesday of this week she fractured her left hip and has been staying at hospice ever since. She is planned for hemiarthroplasty with Dr. Olga Juarez tomorrow and is presenting today to be admitted preprocedure. She has no acute complaints or concerns other than the need to continue her regimen for analgesia from hospice. ED course: Vitals were unremarkable. . Labs were remarkable for hypokalemia of 3.2. EKG was remarkable for normal EKG, normal sinus rhythm. Internal medicine was consulted to admit the patient for medical management prior to operation. ROS:     Review of Systems   Constitutional:  Negative for chills and fever. HENT:  Negative for nosebleeds and trouble swallowing. Eyes:  Negative for photophobia and visual disturbance. Respiratory:  Negative for chest tightness and wheezing. Cardiovascular:  Negative for chest pain, palpitations and leg swelling. Gastrointestinal:  Negative for blood in stool, constipation, diarrhea, nausea and vomiting. Endocrine: Positive for cold intolerance. Genitourinary:  Negative for dysuria and hematuria. Musculoskeletal:  Positive for arthralgias and myalgias. Neurological:  Negative for dizziness, syncope, weakness and light-headedness.        Past Medical History:   Diagnosis Date    Displaced fracture of left femoral neck ([facility] 11/16/2022    Opiate use 6/27/2019    PRES (posterior reversible encephalopathy syndrome) 6/27/2019    Clinically improving mouth daily    Historical Provider, MD        Allergies:   Lyrica [pregabalin]    Social History:    reports that she has been smoking cigarettes. She has a 30.00 pack-year smoking history. She has never used smokeless tobacco. She reports that she does not drink alcohol and does not use drugs. Family History:   family history includes Heart Surgery in her father. PHYSICAL EXAM:    Vitals:  /63   Pulse 72   Temp 98 °F (36.7 °C)   Resp 16   Ht 5' 1\" (1.549 m)   Wt 140 lb (63.5 kg)   SpO2 92%   BMI 26.45 kg/m²     Physical Exam  Vitals reviewed. Constitutional:       General: She is not in acute distress. Appearance: Normal appearance. HENT:      Head: Normocephalic and atraumatic. Right Ear: External ear normal.      Left Ear: External ear normal.      Nose: Nose normal.      Mouth/Throat:      Mouth: Mucous membranes are moist.   Eyes:      Extraocular Movements: Extraocular movements intact. Conjunctiva/sclera: Conjunctivae normal.   Cardiovascular:      Rate and Rhythm: Normal rate and regular rhythm. Heart sounds: Normal heart sounds. No murmur heard. No friction rub. No gallop. Pulmonary:      Effort: Pulmonary effort is normal.      Breath sounds: Normal breath sounds. No wheezing or rales. Abdominal:      General: Abdomen is flat. Palpations: Abdomen is soft. There is no mass. Tenderness: There is no abdominal tenderness. There is no guarding. Musculoskeletal:         General: Signs of injury present. Normal range of motion. Cervical back: Normal range of motion and neck supple. Comments: Tenderness to palpation around left hip. Decreased range of motion of left lower extremity secondary to hip fracture. Skin:     General: Skin is warm and dry. Neurological:      General: No focal deficit present. Mental Status: She is alert.    Psychiatric:         Mood and Affect: Mood normal.       LABS:  Recent Results (from the past 24 hour(s))   CBC with Auto Differential    Collection Time: 11/19/22  3:02 PM   Result Value Ref Range    WBC 9.2 4.5 - 11.5 E9/L    RBC 4.54 3.50 - 5.50 E12/L    Hemoglobin 13.7 11.5 - 15.5 g/dL    Hematocrit 44.4 34.0 - 48.0 %    MCV 97.8 80.0 - 99.9 fL    MCH 30.2 26.0 - 35.0 pg    MCHC 30.9 (L) 32.0 - 34.5 %    RDW 15.7 (H) 11.5 - 15.0 fL    Platelets 978 877 - 802 E9/L    MPV 9.4 7.0 - 12.0 fL    Neutrophils % 83.2 (H) 43.0 - 80.0 %    Immature Granulocytes % 0.4 0.0 - 5.0 %    Lymphocytes % 14.1 (L) 20.0 - 42.0 %    Monocytes % 2.2 2.0 - 12.0 %    Eosinophils % 0.0 0.0 - 6.0 %    Basophils % 0.1 0.0 - 2.0 %    Neutrophils Absolute 7.61 (H) 1.80 - 7.30 E9/L    Immature Granulocytes # 0.04 E9/L    Lymphocytes Absolute 1.29 (L) 1.50 - 4.00 E9/L    Monocytes Absolute 0.20 0.10 - 0.95 E9/L    Eosinophils Absolute 0.00 (L) 0.05 - 0.50 E9/L    Basophils Absolute 0.01 0.00 - 0.20 H5/F   Basic Metabolic Panel w/ Reflex to MG    Collection Time: 11/19/22  3:02 PM   Result Value Ref Range    Sodium 140 132 - 146 mmol/L    Potassium reflex Magnesium 3.2 (L) 3.5 - 5.0 mmol/L    Chloride 96 (L) 98 - 107 mmol/L    CO2 32 (H) 22 - 29 mmol/L    Anion Gap 12 7 - 16 mmol/L    Glucose 114 (H) 74 - 99 mg/dL    BUN 25 (H) 6 - 23 mg/dL    Creatinine 0.9 0.5 - 1.0 mg/dL    Est, Glom Filt Rate >60 >=60 mL/min/1.73    Calcium 9.1 8.6 - 10.2 mg/dL   Magnesium    Collection Time: 11/19/22  3:02 PM   Result Value Ref Range    Magnesium 2.3 1.6 - 2.6 mg/dL       No orders to display       ASSESSMENT/PLAN:      Active Hospital Problems    Diagnosis Date Noted    Hip fracture requiring operative repair, left, closed, initial encounter [facility]) Gerald Short 11/19/2022     Priority: Medium     Left subcapital femoral neck fracture  Patient is planned for hemiarthroplasty by Dr. Dillon Early tomorrow. Patient with normal sinus rhythm and no ischemic changes on EKG. Endorse history of tolerating exercise equivalents.   No history of CAD/MI, no history of problems with anesthesia. Patient is at average risk for complications of the surgery and is medically able to do so.   N.p.o. at midnight, no DVT prophylaxis until postsurgery when okayed by orthopedics    History of hypertension  Continue home dose of metoprolol    History of lung cancer with metastasis to bone  Reviewed patient's medication list from hospice, continue home medications with substitution of oral morphine for IV morphine as patient must be n.p.o. at midnight    Hypokalemia  Potassium 3.2 on BMP, will replete with 40 mill equivalents Klor-Con and recheck in the morning, magnesium within normal limits      DVT ppx: PCDs  GI ppx: None  Code Status: DNR-CC  Diet: NPO after midnight      Case discussed with attending, Dr. Amanda Mejia MD   Family Medicine Resident Physician PGY-1  11/19/2022

## 2022-11-20 ENCOUNTER — ANESTHESIA (OUTPATIENT)
Dept: OPERATING ROOM | Age: 62
DRG: 522 | End: 2022-11-20
Payer: MEDICARE

## 2022-11-20 ENCOUNTER — APPOINTMENT (OUTPATIENT)
Dept: GENERAL RADIOLOGY | Age: 62
DRG: 522 | End: 2022-11-20
Payer: MEDICARE

## 2022-11-20 ENCOUNTER — ANESTHESIA EVENT (OUTPATIENT)
Dept: OPERATING ROOM | Age: 62
DRG: 522 | End: 2022-11-20
Payer: MEDICARE

## 2022-11-20 LAB
ANION GAP SERPL CALCULATED.3IONS-SCNC: 7 MMOL/L (ref 7–16)
BASOPHILS ABSOLUTE: 0.01 E9/L (ref 0–0.2)
BASOPHILS RELATIVE PERCENT: 0.1 % (ref 0–2)
BUN BLDV-MCNC: 25 MG/DL (ref 6–23)
CALCIUM SERPL-MCNC: 9.1 MG/DL (ref 8.6–10.2)
CHLORIDE BLD-SCNC: 102 MMOL/L (ref 98–107)
CO2: 33 MMOL/L (ref 22–29)
CREAT SERPL-MCNC: 0.8 MG/DL (ref 0.5–1)
EKG ATRIAL RATE: 78 BPM
EKG P AXIS: 57 DEGREES
EKG P-R INTERVAL: 128 MS
EKG Q-T INTERVAL: 396 MS
EKG QRS DURATION: 84 MS
EKG QTC CALCULATION (BAZETT): 451 MS
EKG R AXIS: 59 DEGREES
EKG T AXIS: 69 DEGREES
EKG VENTRICULAR RATE: 78 BPM
EOSINOPHILS ABSOLUTE: 0.04 E9/L (ref 0.05–0.5)
EOSINOPHILS RELATIVE PERCENT: 0.5 % (ref 0–6)
GFR SERPL CREATININE-BSD FRML MDRD: >60 ML/MIN/1.73
GLUCOSE BLD-MCNC: 87 MG/DL (ref 74–99)
HCT VFR BLD CALC: 43.6 % (ref 34–48)
HEMOGLOBIN: 13.7 G/DL (ref 11.5–15.5)
IMMATURE GRANULOCYTES #: 0.04 E9/L
IMMATURE GRANULOCYTES %: 0.5 % (ref 0–5)
LYMPHOCYTES ABSOLUTE: 2.67 E9/L (ref 1.5–4)
LYMPHOCYTES RELATIVE PERCENT: 30.7 % (ref 20–42)
MCH RBC QN AUTO: 30.9 PG (ref 26–35)
MCHC RBC AUTO-ENTMCNC: 31.4 % (ref 32–34.5)
MCV RBC AUTO: 98.2 FL (ref 80–99.9)
MONOCYTES ABSOLUTE: 0.41 E9/L (ref 0.1–0.95)
MONOCYTES RELATIVE PERCENT: 4.7 % (ref 2–12)
NEUTROPHILS ABSOLUTE: 5.53 E9/L (ref 1.8–7.3)
NEUTROPHILS RELATIVE PERCENT: 63.5 % (ref 43–80)
PDW BLD-RTO: 15.7 FL (ref 11.5–15)
PLATELET # BLD: 248 E9/L (ref 130–450)
PMV BLD AUTO: 9.2 FL (ref 7–12)
POTASSIUM REFLEX MAGNESIUM: 4.5 MMOL/L (ref 3.5–5)
RBC # BLD: 4.44 E12/L (ref 3.5–5.5)
SODIUM BLD-SCNC: 142 MMOL/L (ref 132–146)
WBC # BLD: 8.7 E9/L (ref 4.5–11.5)

## 2022-11-20 PROCEDURE — 2580000003 HC RX 258: Performed by: STUDENT IN AN ORGANIZED HEALTH CARE EDUCATION/TRAINING PROGRAM

## 2022-11-20 PROCEDURE — 2709999900 HC NON-CHARGEABLE SUPPLY: Performed by: STUDENT IN AN ORGANIZED HEALTH CARE EDUCATION/TRAINING PROGRAM

## 2022-11-20 PROCEDURE — 6360000002 HC RX W HCPCS: Performed by: NURSE ANESTHETIST, CERTIFIED REGISTERED

## 2022-11-20 PROCEDURE — 2500000003 HC RX 250 WO HCPCS: Performed by: NURSE ANESTHETIST, CERTIFIED REGISTERED

## 2022-11-20 PROCEDURE — 6360000002 HC RX W HCPCS: Performed by: ANESTHESIOLOGY

## 2022-11-20 PROCEDURE — 85025 COMPLETE CBC W/AUTO DIFF WBC: CPT

## 2022-11-20 PROCEDURE — 6360000002 HC RX W HCPCS: Performed by: STUDENT IN AN ORGANIZED HEALTH CARE EDUCATION/TRAINING PROGRAM

## 2022-11-20 PROCEDURE — 87081 CULTURE SCREEN ONLY: CPT

## 2022-11-20 PROCEDURE — 6360000002 HC RX W HCPCS

## 2022-11-20 PROCEDURE — 2500000003 HC RX 250 WO HCPCS: Performed by: STUDENT IN AN ORGANIZED HEALTH CARE EDUCATION/TRAINING PROGRAM

## 2022-11-20 PROCEDURE — 2580000003 HC RX 258: Performed by: NURSE ANESTHETIST, CERTIFIED REGISTERED

## 2022-11-20 PROCEDURE — 3700000000 HC ANESTHESIA ATTENDED CARE: Performed by: STUDENT IN AN ORGANIZED HEALTH CARE EDUCATION/TRAINING PROGRAM

## 2022-11-20 PROCEDURE — 0SRS0JA REPLACEMENT OF LEFT HIP JOINT, FEMORAL SURFACE WITH SYNTHETIC SUBSTITUTE, UNCEMENTED, OPEN APPROACH: ICD-10-PCS | Performed by: STUDENT IN AN ORGANIZED HEALTH CARE EDUCATION/TRAINING PROGRAM

## 2022-11-20 PROCEDURE — 7100000000 HC PACU RECOVERY - FIRST 15 MIN: Performed by: STUDENT IN AN ORGANIZED HEALTH CARE EDUCATION/TRAINING PROGRAM

## 2022-11-20 PROCEDURE — 6370000000 HC RX 637 (ALT 250 FOR IP): Performed by: STUDENT IN AN ORGANIZED HEALTH CARE EDUCATION/TRAINING PROGRAM

## 2022-11-20 PROCEDURE — 1200000000 HC SEMI PRIVATE

## 2022-11-20 PROCEDURE — 88311 DECALCIFY TISSUE: CPT

## 2022-11-20 PROCEDURE — 80048 BASIC METABOLIC PNL TOTAL CA: CPT

## 2022-11-20 PROCEDURE — 6370000000 HC RX 637 (ALT 250 FOR IP)

## 2022-11-20 PROCEDURE — 3600000014 HC SURGERY LEVEL 4 ADDTL 15MIN: Performed by: STUDENT IN AN ORGANIZED HEALTH CARE EDUCATION/TRAINING PROGRAM

## 2022-11-20 PROCEDURE — 88305 TISSUE EXAM BY PATHOLOGIST: CPT

## 2022-11-20 PROCEDURE — 99233 SBSQ HOSP IP/OBS HIGH 50: CPT | Performed by: INTERNAL MEDICINE

## 2022-11-20 PROCEDURE — 7100000001 HC PACU RECOVERY - ADDTL 15 MIN: Performed by: STUDENT IN AN ORGANIZED HEALTH CARE EDUCATION/TRAINING PROGRAM

## 2022-11-20 PROCEDURE — 73502 X-RAY EXAM HIP UNI 2-3 VIEWS: CPT

## 2022-11-20 PROCEDURE — 27236 TREAT THIGH FRACTURE: CPT | Performed by: STUDENT IN AN ORGANIZED HEALTH CARE EDUCATION/TRAINING PROGRAM

## 2022-11-20 PROCEDURE — 93010 ELECTROCARDIOGRAM REPORT: CPT | Performed by: INTERNAL MEDICINE

## 2022-11-20 PROCEDURE — 36415 COLL VENOUS BLD VENIPUNCTURE: CPT

## 2022-11-20 PROCEDURE — 3700000001 HC ADD 15 MINUTES (ANESTHESIA): Performed by: STUDENT IN AN ORGANIZED HEALTH CARE EDUCATION/TRAINING PROGRAM

## 2022-11-20 PROCEDURE — C1776 JOINT DEVICE (IMPLANTABLE): HCPCS | Performed by: STUDENT IN AN ORGANIZED HEALTH CARE EDUCATION/TRAINING PROGRAM

## 2022-11-20 PROCEDURE — 3600000004 HC SURGERY LEVEL 4 BASE: Performed by: STUDENT IN AN ORGANIZED HEALTH CARE EDUCATION/TRAINING PROGRAM

## 2022-11-20 DEVICE — BIPOLAR COMPONENT
Type: IMPLANTABLE DEVICE | Site: HIP | Status: FUNCTIONAL
Brand: UHR

## 2022-11-20 DEVICE — HEAD FEM 0 26 MM HIP VIT COCR V40 ABG II MOD: Type: IMPLANTABLE DEVICE | Site: HIP | Status: FUNCTIONAL

## 2022-11-20 DEVICE — 127 DEGREE NECK ANGLE HIP STEM
Type: IMPLANTABLE DEVICE | Site: HIP | Status: FUNCTIONAL
Brand: ACCOLADE

## 2022-11-20 RX ORDER — PROPOFOL 10 MG/ML
INJECTION, EMULSION INTRAVENOUS PRN
Status: DISCONTINUED | OUTPATIENT
Start: 2022-11-20 | End: 2022-11-20 | Stop reason: SDUPTHER

## 2022-11-20 RX ORDER — LABETALOL HYDROCHLORIDE 5 MG/ML
5 INJECTION, SOLUTION INTRAVENOUS
Status: DISCONTINUED | OUTPATIENT
Start: 2022-11-20 | End: 2022-11-20 | Stop reason: HOSPADM

## 2022-11-20 RX ORDER — ENOXAPARIN SODIUM 100 MG/ML
40 INJECTION SUBCUTANEOUS DAILY
Status: DISCONTINUED | OUTPATIENT
Start: 2022-11-20 | End: 2022-11-21 | Stop reason: HOSPADM

## 2022-11-20 RX ORDER — MEPERIDINE HYDROCHLORIDE 25 MG/ML
12.5 INJECTION INTRAMUSCULAR; INTRAVENOUS; SUBCUTANEOUS ONCE
Status: COMPLETED | OUTPATIENT
Start: 2022-11-20 | End: 2022-11-20

## 2022-11-20 RX ORDER — KETAMINE HYDROCHLORIDE 10 MG/ML
INJECTION, SOLUTION INTRAMUSCULAR; INTRAVENOUS PRN
Status: DISCONTINUED | OUTPATIENT
Start: 2022-11-20 | End: 2022-11-20 | Stop reason: SDUPTHER

## 2022-11-20 RX ORDER — ROCURONIUM BROMIDE 10 MG/ML
INJECTION, SOLUTION INTRAVENOUS PRN
Status: DISCONTINUED | OUTPATIENT
Start: 2022-11-20 | End: 2022-11-20 | Stop reason: SDUPTHER

## 2022-11-20 RX ORDER — DIPHENHYDRAMINE HYDROCHLORIDE 50 MG/ML
12.5 INJECTION INTRAMUSCULAR; INTRAVENOUS
Status: DISCONTINUED | OUTPATIENT
Start: 2022-11-20 | End: 2022-11-20 | Stop reason: HOSPADM

## 2022-11-20 RX ORDER — ONDANSETRON 2 MG/ML
INJECTION INTRAMUSCULAR; INTRAVENOUS PRN
Status: DISCONTINUED | OUTPATIENT
Start: 2022-11-20 | End: 2022-11-20 | Stop reason: SDUPTHER

## 2022-11-20 RX ORDER — PROCHLORPERAZINE EDISYLATE 5 MG/ML
5 INJECTION INTRAMUSCULAR; INTRAVENOUS
Status: DISCONTINUED | OUTPATIENT
Start: 2022-11-20 | End: 2022-11-20 | Stop reason: HOSPADM

## 2022-11-20 RX ORDER — MIDAZOLAM HYDROCHLORIDE 1 MG/ML
INJECTION INTRAMUSCULAR; INTRAVENOUS PRN
Status: DISCONTINUED | OUTPATIENT
Start: 2022-11-20 | End: 2022-11-20 | Stop reason: SDUPTHER

## 2022-11-20 RX ORDER — HYDRALAZINE HYDROCHLORIDE 20 MG/ML
5 INJECTION INTRAMUSCULAR; INTRAVENOUS
Status: DISCONTINUED | OUTPATIENT
Start: 2022-11-20 | End: 2022-11-20 | Stop reason: HOSPADM

## 2022-11-20 RX ORDER — FENTANYL CITRATE 50 UG/ML
INJECTION, SOLUTION INTRAMUSCULAR; INTRAVENOUS PRN
Status: DISCONTINUED | OUTPATIENT
Start: 2022-11-20 | End: 2022-11-20 | Stop reason: SDUPTHER

## 2022-11-20 RX ORDER — DEXAMETHASONE SODIUM PHOSPHATE 4 MG/ML
INJECTION, SOLUTION INTRA-ARTICULAR; INTRALESIONAL; INTRAMUSCULAR; INTRAVENOUS; SOFT TISSUE PRN
Status: DISCONTINUED | OUTPATIENT
Start: 2022-11-20 | End: 2022-11-20 | Stop reason: SDUPTHER

## 2022-11-20 RX ORDER — SODIUM CHLORIDE 0.9 % (FLUSH) 0.9 %
5-40 SYRINGE (ML) INJECTION PRN
Status: DISCONTINUED | OUTPATIENT
Start: 2022-11-20 | End: 2022-11-20 | Stop reason: HOSPADM

## 2022-11-20 RX ORDER — NEOSTIGMINE METHYLSULFATE 1 MG/ML
INJECTION, SOLUTION INTRAVENOUS PRN
Status: DISCONTINUED | OUTPATIENT
Start: 2022-11-20 | End: 2022-11-20 | Stop reason: SDUPTHER

## 2022-11-20 RX ORDER — SODIUM CHLORIDE 9 MG/ML
INJECTION, SOLUTION INTRAVENOUS PRN
Status: DISCONTINUED | OUTPATIENT
Start: 2022-11-20 | End: 2022-11-20 | Stop reason: HOSPADM

## 2022-11-20 RX ORDER — SODIUM CHLORIDE 0.9 % (FLUSH) 0.9 %
5-40 SYRINGE (ML) INJECTION EVERY 12 HOURS SCHEDULED
Status: DISCONTINUED | OUTPATIENT
Start: 2022-11-20 | End: 2022-11-20 | Stop reason: HOSPADM

## 2022-11-20 RX ORDER — GLYCOPYRROLATE 0.2 MG/ML
INJECTION INTRAMUSCULAR; INTRAVENOUS PRN
Status: DISCONTINUED | OUTPATIENT
Start: 2022-11-20 | End: 2022-11-20 | Stop reason: SDUPTHER

## 2022-11-20 RX ORDER — LIDOCAINE HYDROCHLORIDE 20 MG/ML
INJECTION, SOLUTION EPIDURAL; INFILTRATION; INTRACAUDAL; PERINEURAL PRN
Status: DISCONTINUED | OUTPATIENT
Start: 2022-11-20 | End: 2022-11-20 | Stop reason: SDUPTHER

## 2022-11-20 RX ORDER — FENTANYL CITRATE 50 UG/ML
25 INJECTION, SOLUTION INTRAMUSCULAR; INTRAVENOUS EVERY 5 MIN PRN
Status: DISCONTINUED | OUTPATIENT
Start: 2022-11-20 | End: 2022-11-20 | Stop reason: HOSPADM

## 2022-11-20 RX ORDER — SODIUM CHLORIDE, SODIUM LACTATE, POTASSIUM CHLORIDE, CALCIUM CHLORIDE 600; 310; 30; 20 MG/100ML; MG/100ML; MG/100ML; MG/100ML
INJECTION, SOLUTION INTRAVENOUS CONTINUOUS PRN
Status: DISCONTINUED | OUTPATIENT
Start: 2022-11-20 | End: 2022-11-20 | Stop reason: SDUPTHER

## 2022-11-20 RX ORDER — SODIUM CHLORIDE 9 MG/ML
INJECTION, SOLUTION INTRAVENOUS CONTINUOUS PRN
Status: DISCONTINUED | OUTPATIENT
Start: 2022-11-20 | End: 2022-11-20

## 2022-11-20 RX ADMIN — TRANEXAMIC ACID 1000 MG: 1 INJECTION, SOLUTION INTRAVENOUS at 12:40

## 2022-11-20 RX ADMIN — WATER 2000 MG: 1 INJECTION INTRAMUSCULAR; INTRAVENOUS; SUBCUTANEOUS at 22:30

## 2022-11-20 RX ADMIN — QUETIAPINE FUMARATE 25 MG: 25 TABLET ORAL at 23:27

## 2022-11-20 RX ADMIN — HYDROMORPHONE HYDROCHLORIDE 0.5 MG: 0.5 INJECTION, SOLUTION INTRAMUSCULAR; INTRAVENOUS; SUBCUTANEOUS at 14:00

## 2022-11-20 RX ADMIN — GLYCOPYRROLATE 0.4 MG: 0.2 INJECTION, SOLUTION INTRAMUSCULAR; INTRAVENOUS at 13:21

## 2022-11-20 RX ADMIN — DEXAMETHASONE SODIUM PHOSPHATE 8 MG: 4 INJECTION, SOLUTION INTRAMUSCULAR; INTRAVENOUS at 12:45

## 2022-11-20 RX ADMIN — METHADONE HYDROCHLORIDE 5 MG: 5 TABLET ORAL at 06:33

## 2022-11-20 RX ADMIN — PROPOFOL 90 MG: 10 INJECTION, EMULSION INTRAVENOUS at 12:30

## 2022-11-20 RX ADMIN — MORPHINE SULFATE 2 MG: 2 INJECTION, SOLUTION INTRAMUSCULAR; INTRAVENOUS at 23:28

## 2022-11-20 RX ADMIN — Medication 2 MG: at 13:21

## 2022-11-20 RX ADMIN — MEPERIDINE HYDROCHLORIDE 12.5 MG: 25 INJECTION, SOLUTION INTRAMUSCULAR; INTRAVENOUS; SUBCUTANEOUS at 14:12

## 2022-11-20 RX ADMIN — MIDAZOLAM 2 MG: 1 INJECTION INTRAMUSCULAR; INTRAVENOUS at 12:17

## 2022-11-20 RX ADMIN — KETAMINE HYDROCHLORIDE 20 MG: 10 INJECTION INTRAMUSCULAR; INTRAVENOUS at 12:30

## 2022-11-20 RX ADMIN — MORPHINE SULFATE 2 MG: 2 INJECTION, SOLUTION INTRAMUSCULAR; INTRAVENOUS at 19:04

## 2022-11-20 RX ADMIN — ONDANSETRON 4 MG: 2 INJECTION INTRAMUSCULAR; INTRAVENOUS at 12:45

## 2022-11-20 RX ADMIN — MIRTAZAPINE 15 MG: 15 TABLET, FILM COATED ORAL at 23:27

## 2022-11-20 RX ADMIN — SODIUM CHLORIDE, POTASSIUM CHLORIDE, SODIUM LACTATE AND CALCIUM CHLORIDE: 600; 310; 30; 20 INJECTION, SOLUTION INTRAVENOUS at 12:05

## 2022-11-20 RX ADMIN — LIDOCAINE HYDROCHLORIDE 80 MG: 20 INJECTION, SOLUTION EPIDURAL; INFILTRATION; INTRACAUDAL; PERINEURAL at 12:30

## 2022-11-20 RX ADMIN — WATER 2000 MG: 1 INJECTION INTRAMUSCULAR; INTRAVENOUS; SUBCUTANEOUS at 12:17

## 2022-11-20 RX ADMIN — HYDROMORPHONE HYDROCHLORIDE 0.5 MG: 0.5 INJECTION, SOLUTION INTRAMUSCULAR; INTRAVENOUS; SUBCUTANEOUS at 14:08

## 2022-11-20 RX ADMIN — TRANEXAMIC ACID 1000 MG: 1 INJECTION, SOLUTION INTRAVENOUS at 15:12

## 2022-11-20 RX ADMIN — DOCUSATE SODIUM 50 MG AND SENNOSIDES 8.6 MG 2 TABLET: 8.6; 5 TABLET, FILM COATED ORAL at 23:27

## 2022-11-20 RX ADMIN — MORPHINE SULFATE 2 MG: 2 INJECTION, SOLUTION INTRAMUSCULAR; INTRAVENOUS at 03:50

## 2022-11-20 RX ADMIN — ROCURONIUM BROMIDE 40 MG: 10 INJECTION, SOLUTION INTRAVENOUS at 12:30

## 2022-11-20 RX ADMIN — METHADONE HYDROCHLORIDE 5 MG: 5 TABLET ORAL at 19:04

## 2022-11-20 RX ADMIN — MORPHINE SULFATE 2 MG: 2 INJECTION, SOLUTION INTRAMUSCULAR; INTRAVENOUS at 17:01

## 2022-11-20 RX ADMIN — FENTANYL CITRATE 100 MCG: 50 INJECTION, SOLUTION INTRAMUSCULAR; INTRAVENOUS at 12:30

## 2022-11-20 ASSESSMENT — PAIN SCALES - GENERAL
PAINLEVEL_OUTOF10: 0
PAINLEVEL_OUTOF10: 10
PAINLEVEL_OUTOF10: 7
PAINLEVEL_OUTOF10: 10
PAINLEVEL_OUTOF10: 9
PAINLEVEL_OUTOF10: 0
PAINLEVEL_OUTOF10: 0
PAINLEVEL_OUTOF10: 10
PAINLEVEL_OUTOF10: 0
PAINLEVEL_OUTOF10: 8
PAINLEVEL_OUTOF10: 8
PAINLEVEL_OUTOF10: 7
PAINLEVEL_OUTOF10: 10

## 2022-11-20 ASSESSMENT — PAIN DESCRIPTION - LOCATION
LOCATION: HIP
LOCATION: HIP
LOCATION: LEG
LOCATION: KNEE
LOCATION: HIP;LEG
LOCATION: HIP
LOCATION: HIP

## 2022-11-20 ASSESSMENT — LIFESTYLE VARIABLES: SMOKING_STATUS: 1

## 2022-11-20 ASSESSMENT — PAIN DESCRIPTION - ORIENTATION
ORIENTATION: LEFT

## 2022-11-20 ASSESSMENT — PAIN DESCRIPTION - PAIN TYPE
TYPE: ACUTE PAIN;CHRONIC PAIN
TYPE: ACUTE PAIN;SURGICAL PAIN
TYPE: ACUTE PAIN;SURGICAL PAIN

## 2022-11-20 ASSESSMENT — PAIN DESCRIPTION - DESCRIPTORS
DESCRIPTORS: ACHING;DISCOMFORT;NAGGING
DESCRIPTORS: DISCOMFORT;ACHING;THROBBING
DESCRIPTORS: ACHING;DISCOMFORT;SORE;NAGGING
DESCRIPTORS: SORE
DESCRIPTORS: ACHING;SHARP
DESCRIPTORS: ACHING;DISCOMFORT
DESCRIPTORS: ACHING;SORE

## 2022-11-20 ASSESSMENT — PAIN DESCRIPTION - ONSET
ONSET: ON-GOING

## 2022-11-20 ASSESSMENT — ENCOUNTER SYMPTOMS: DYSPNEA ACTIVITY LEVEL: NO INTERVAL CHANGE

## 2022-11-20 ASSESSMENT — PAIN DESCRIPTION - FREQUENCY
FREQUENCY: CONTINUOUS

## 2022-11-20 ASSESSMENT — PAIN - FUNCTIONAL ASSESSMENT
PAIN_FUNCTIONAL_ASSESSMENT: PREVENTS OR INTERFERES SOME ACTIVE ACTIVITIES AND ADLS
PAIN_FUNCTIONAL_ASSESSMENT: PREVENTS OR INTERFERES WITH MANY ACTIVE NOT PASSIVE ACTIVITIES
PAIN_FUNCTIONAL_ASSESSMENT: PREVENTS OR INTERFERES SOME ACTIVE ACTIVITIES AND ADLS
PAIN_FUNCTIONAL_ASSESSMENT: ACTIVITIES ARE NOT PREVENTED

## 2022-11-20 NOTE — PROGRESS NOTES
[facility] Progress Note    Admitting Date and Time: 11/19/2022  2:05 PM  Admit Dx: Closed fracture of left hip, initial encounter ([facility] [S72.002A]  Hip fracture requiring operative repair, left, closed, initial encounter ([facility] [S72.002A]    Subjective:  Patient is being followed for Closed fracture of left hip, initial encounter ([facility] [S72.002A]  Hip fracture requiring operative repair, left, closed, initial encounter [facility]) Trung Peters     Patient is disgruntled because her surgery was not this morning. She asked me to leave her alone when I was asking her if there is anything I can do for her. ROS: denies fever, chills, cp, sob, n/v, HA unless stated above.       ceFAZolin  2,000 mg IntraVENous Once    tranexamic acid (CYCLOKAPRON) IVPB  1,000 mg IntraVENous Once    sodium chloride flush  5-40 mL IntraVENous 2 times per day    nicotine  1 patch TransDERmal Daily    methadone  5 mg Oral 3 times per day    mirtazapine  15 mg Oral Nightly    metoprolol succinate  50 mg Oral Daily    sennosides-docusate sodium  2 tablet Oral BID     sodium chloride flush, 5-40 mL, PRN  sodium chloride, , PRN  ondansetron, 4 mg, Q8H PRN   Or  ondansetron, 4 mg, Q6H PRN  polyethylene glycol, 17 g, Daily PRN  acetaminophen, 650 mg, Q6H PRN   Or  acetaminophen, 650 mg, Q6H PRN  morphine, 2 mg, Q1H PRN  QUEtiapine, 25 mg, Q6H PRN         Objective:    /88   Pulse 64   Temp 97.8 °F (36.6 °C) (Oral)   Resp 18   Ht 5' 1\" (1.549 m)   Wt 140 lb (63.5 kg)   SpO2 94%   BMI 26.45 kg/m²     General Appearance: alert and oriented to person, place and time and in no acute distress  Skin: warm and dry  Head: normocephalic and atraumatic  Eyes: pupils equal, round, and reactive to light, extraocular eye movements intact, conjunctivae normal  Neck: neck supple and non tender without mass   Pulmonary/Chest: clear to auscultation bilaterally- no wheezes, rales or rhonchi, normal air movement, no respiratory distress  Cardiovascular: normal rate, normal S1 and S2 and no carotid bruits  Abdomen: soft, non-tender, non-distended, normal bowel sounds, no masses or organomegaly  Extremities: no cyanosis, no clubbing and no edema  Neurologic: no cranial nerve deficit and speech normal        Recent Labs     11/19/22  1502 11/20/22  0300    142   K 3.2* 4.5   CL 96* 102   CO2 32* 33*   BUN 25* 25*   CREATININE 0.9 0.8   GLUCOSE 114* 87   CALCIUM 9.1 9.1       Recent Labs     11/19/22  1502 11/20/22  0300   WBC 9.2 8.7   RBC 4.54 4.44   HGB 13.7 13.7   HCT 44.4 43.6   MCV 97.8 98.2   MCH 30.2 30.9   MCHC 30.9* 31.4*   RDW 15.7* 15.7*    248   MPV 9.4 9.2         Assessment:    Principal Problem:    Hip fracture requiring operative repair, left, closed, initial encounter ([facility]  Resolved Problems:    * No resolved hospital problems. *      Plan:    Displaced left subcapital femoral neck fracture - Patient is NPO. Orthopedic surgery on board. HTN - Continue toprol  H/O Lung cancer with metastasis to bone  DVT prophylaxis - Lovenox      NOTE: This report was transcribed using voice recognition software. Every effort was made to ensure accuracy; however, inadvertent computerized transcription errors may be present.   Electronically signed by Darion Shaver DO on 11/20/2022 at 12:19 PM

## 2022-11-20 NOTE — OP NOTE
Operative Note      Patient: Darion Shearer  YOB: 1960  MRN: [id number]    Date of Procedure: 11/20/2022    Pre-Op Diagnosis: Closed displaced left femoral neck fracture    Post-Op Diagnosis: Same       Procedure(s):  LEFT HIP HEMIARTHROPLASTY    Surgeon(s):  Oseas Santos DO    Assistant:   Resident: Juan Abreu DO; Wilfrido Pacheco DO    Anesthesia: General    Estimated Blood Loss (mL): less than 083     Complications: None    Specimens:   ID Type Source Tests Collected by Time Destination   A : LEFT FEMORAL HEAD Bone Bone SURGICAL PATHOLOGY Oseas Santos DO 11/20/2022 1302        Implants:  Implant Name Type Inv. Item Serial No.  Lot No. LRB No. Used Action   STEM FEM SZ 5 L108MM NK L35MM 44MM OFFSET 127DEG HIP TI - [id number]  STEM FEM SZ 5 L108MM NK L35MM 44MM OFFSET 127DEG HIP TI  [employer] [id number] Left 1 Implanted   HEAD FEM 0 26 MM HIP VIT COCR V40 ABG II MOD - [id number]  HEAD FEM 0 26 MM HIP VIT COCR V40 ABG II MOD  [employer] [id number] Left 1 Implanted   HEAD FEM OD45MM ID26MM HIP CO CHROM POLYETH BPLR CEMENTLESS - [id number]  HEAD FEM OD45MM ID26MM HIP CO CHROM POLYETH BPLR CEMENTLESS  [employer] [id number] Left 1 Implanted         Drains:   Urinary Catheter 11/19/22 (Active)   Site Assessment No urethral drainage 11/20/22 0700   Urine Color Yellow 11/20/22 0700   Urine Appearance Clear 11/20/22 0700   Collection Container Standard 11/20/22 0700   Securement Method Leg strap 11/20/22 0700   Catheter Care Completed Yes 11/20/22 0700   Catheter Best Practices  Drainage tube clipped to bed;Catheter secured to thigh; Tamper seal intact; Bag not on floor; Bag below bladder;Drainage bag less than half full;Lack of dependent loop in tubing 11/20/22 0700   Status Patent;Draining 11/20/22 0700   Output (mL) 700 mL 11/20/22 0700       Findings: See operative report    Detailed Description of Procedure:    Timoteo Dumont is a 39-year-old female with metastatic lung cancer currently on hospice care. She sustained a femoral neck fracture with displacement on 15 November. She has been having increasing pain and inability to transfer. She was transferred back to the hospital for surgical management to help with palliative pain control. Risk-benefit and alternatives of hip hemiarthroplasty discussed in detail with the patient including infection dislocation and damage to surrounding structures. Patient understand and wish to proceed with surgery. She was seen on day of surgery in preop holding my initials were placed on her left leg and inform consent was signed and obtained placed on chart. Patient was brought to the operative suite and general anesthesia was induced. She was then transferred to the OR table and placed in the lateral decubitus position on a pegboard. All bony prominences were well-padded. Axillary roll was placed and her common peroneal nerve was padded. Preoperative antibiotics and tranexamic acid were infused. Left hip is then prepped and draped in standard sterile orthopedic fashion. Appropriate timeout was performed confirming side and site of surgery. A lateral incision was created centered over the greater trochanter and dissection was carried down to the IT band. IT band was incised along with our incision. Charnley retractors were placed. Bursectomy was performed. Gluteus medius tendon was identified. The inferior 30% of the gluteus medius tendon was taken down off the anterolateral aspect the femur along with the gluteus minimus and capsule and 1 layer. This was her anterolateral approach to the hip. Leg was flexed and placed in the bag. Femoral neck was exposed and inferior capsule was released. Retractors were placed exposing the femoral neck. Femoral neck cut was made 15 mm. Residual bone was removed. Leg was placed back on the table and the femoral head was extracted from the acetabulum.   This measured 45 mm.  Acetabulum was irrigated and found to be free of debris. Leg was placed back in the bone and femoral neck elevator was placed to expose our osteotomized femoral neck. Box osteotome was used to enter the canal.  Canal finder was used to enter the distal aspect of the femur. We then sequentially broached up to a size 5 for a [place] Accolade 2 stem. We had good stability in press-fit medial lateral with a size 5 trial.  Broach handle was removed and calcar reamer was used to finish our neck cut. We then trialed a -3 size 45 head. This was carefully reduced in the hip. Hip felt slightly short on leg lengths but had decent stability. He was taken through range of motion and carefully the trial was dislocated. The trial head was removed and the broach handle was replaced. Stability again was tested rotationally and size 5 seem to be a good fit. Rizo Haney was removed. We then impacted a final size 5 Rosie Accolade 2 stem and the place with good fit to the appropriate depth. There is again a good rotational stability. We then selected a neutral neck length size 45 bipolar [place] hemiarthroplasty head. This was impacted onto the [facility] taper on the stem. This was then carefully reduced in the acetabulum. Hip was taken through range of motion felt to be stable. There is good soft tissue tension and leg lengths appear to be equal.  Hemostasis was noted be adequate at this point. The wound was copiously irrigated with sterile saline. The wound was then closed in layered fashion. Capsule was closed using #1 Ethibond suture. Gluteus medius and minimus tendon were repaired back to the greater trochanter through the bone using Ethibond suture. IT band was closed using running #1 strata fix suture. Subcutaneous fat was closed with an 0 Vicryl. Subcutaneous tissue was closed with 2-0 Vicryl and skin was closed using running Monocryl suture and glue.   Aquacel dressing was placed over top of this.    Patient was awakened from anesthesia transferred PACU stable condition. She tolerated the procedure well. She will receive postoperative x-rays. She will return to the floor for postoperative antibiotics along with DVT prophylaxis. She was discharged home from the hospital with aspirin twice a day for DVT prophylaxis along appropriate pain control. She will return to hospice care.     Electronically signed by Latanya Chang DO on 11/20/2022 at 1:32 PM

## 2022-11-20 NOTE — ANESTHESIA POSTPROCEDURE EVALUATION
Department of Anesthesiology  Postprocedure Note    Patient: Caro Brandt  MRN: [id number]  YOB: 1960  Date of evaluation: 11/20/2022      Procedure Summary     Date: 11/20/22 Room / Location: [facility] OR 05 / [facility]    Anesthesia Start: 1217 Anesthesia Stop:     Procedures:       LEFT HIP HEMIARTHROPLASTY (Left: Hip)      Procedure Not Yet Scheduled Diagnosis:       Closed fracture of left hip, initial encounter ([facility]      (Closed fracture of left hip, initial encounter ([facility] [address] [S72.002A])    Surgeons: Prakash Crenshaw DO Responsible Provider: Dipesh Mahoney DO    Anesthesia Type: general ASA Status: 4          Anesthesia Type: No value filed. Jermain Phase I:      Jermain Phase II:        Anesthesia Post Evaluation    Patient location during evaluation: bedside  Patient participation: complete - patient participated  Level of consciousness: awake  Pain score: 3  Airway patency: patent  Nausea & Vomiting: no vomiting and no nausea  Complications: no  Cardiovascular status: hemodynamically stable  Respiratory status: acceptable  Hydration status: stable  Comments: Seen and examined. Progressing as expected.   Multimodal analgesia pain management approach

## 2022-11-20 NOTE — PROGRESS NOTES
Interval H&P:    Patient seen and examined in preoperative holding. Risk-benefit alternatives of hemiarthroplasty discussed in detail. I think this will provide appropriate pain relief for her femoral neck fracture. She will have postsurgical pain and she understands that as well. Informed sent was signed and obtained and placed in the chart. My initials were placed on her left lower extremity. All of her questions were answered in detail.     Rosendo Castillo DO   Orthopaedic Surgery   11/20/2022  12:25 PM

## 2022-11-20 NOTE — BRIEF OP NOTE
Brief Postoperative Note      Patient: Kitty Espino  YOB: 1960  MRN: [id number]    Date of Procedure: 11/20/2022    Pre-Op Diagnosis: Closed displaced left femoral neck fracture    Post-Op Diagnosis: Same       Procedure(s):  LEFT HIP HEMIARTHROPLASTY    Surgeon(s):  Navarro Mccoy DO    Assistant:  Resident: Ranulfo Pérez DO; Poli Byrne DO    Anesthesia: General    Estimated Blood Loss (mL): less than 536     Complications: None    Specimens:   ID Type Source Tests Collected by Time Destination   A : LEFT FEMORAL HEAD Bone Bone SURGICAL PATHOLOGY Navarro Mccoy DO 11/20/2022 1302        Implants:  Implant Name Type Inv. Item Serial No.  Lot No. LRB No. Used Action   STEM FEM SZ 5 L108MM NK L35MM 44MM OFFSET 127DEG HIP TI - [id number]  STEM FEM SZ 5 L108MM NK L35MM 44MM OFFSET 127DEG HIP TI  [employer] [id number] Left 1 Implanted   HEAD FEM 0 26 MM HIP VIT COCR V40 ABG II MOD - [id number]  HEAD FEM 0 26 MM HIP VIT COCR V40 ABG II MOD  [employer] [id number] Left 1 Implanted   HEAD FEM OD45MM ID26MM HIP CO CHROM POLYETH BPLR CEMENTLESS - [id number]  HEAD FEM OD45MM ID26MM HIP CO CHROM POLYETH BPLR CEMENTLESS  [employer] [id number] Left 1 Implanted         Drains:   Urinary Catheter 11/19/22 (Active)   Site Assessment No urethral drainage 11/20/22 0700   Urine Color Yellow 11/20/22 0700   Urine Appearance Clear 11/20/22 0700   Collection Container Standard 11/20/22 0700   Securement Method Leg strap 11/20/22 0700   Catheter Care Completed Yes 11/20/22 0700   Catheter Best Practices  Drainage tube clipped to bed;Catheter secured to thigh; Tamper seal intact; Bag not on floor; Bag below bladder;Drainage bag less than half full;Lack of dependent loop in tubing 11/20/22 0700   Status Patent;Draining 11/20/22 0700   Output (mL) 700 mL 11/20/22 0700       Findings: Left hip hemiarthroplasty through anterolateral approach    Electronically signed by Gallito Eric DO on 11/20/2022 at 1:31 PM

## 2022-11-20 NOTE — PROGRESS NOTES
Two fentanyl patches observed on right thigh. Spoke with Holly TAY and she gave okay for nurse to remove patches since pt cannot recall when they were applied and has other scheduled and prn pain medications.

## 2022-11-20 NOTE — PROGRESS NOTES
Pt upset and has been verbally aggressive towards family and nurse stating she wants to discharge because she doesn't want to wait for a surgery time. Nurse explained to pt multiple times that she is scheduled for surgery this afternoon and she continued to be upset. She c/o pain but declined pain medication because she wants to be discharged. Her sister is the POA and spoke with pt regarding the importance and need for surgery but pt continued to be agitated. Nurse left room to give opt time to herself. Notified Dr. Calli Mclain and Dr. Tasha Hernandez.

## 2022-11-20 NOTE — ANESTHESIA PRE PROCEDURE
mL at 11/19/22 2141    sodium chloride flush 0.9 % injection 5-40 mL  5-40 mL IntraVENous PRN Fercho Segura MD        0.9 % sodium chloride infusion   IntraVENous PRN Fercho Segura MD        ondansetron (ZOFRAN-ODT) disintegrating tablet 4 mg  4 mg Oral Q8H PRN Fercho Segura MD        Or    ondansetron (ZOFRAN) injection 4 mg  4 mg IntraVENous Q6H PRN Fercho Segura MD        polyethylene glycol (GLYCOLAX) packet 17 g  17 g Oral Daily PRN Fercho Segura MD        acetaminophen (TYLENOL) tablet 650 mg  650 mg Oral Q6H PRN Fercho Segura MD        Or    acetaminophen (TYLENOL) suppository 650 mg  650 mg Rectal Q6H PRN Fercho Segura MD        nicotine (NICODERM CQ) 14 MG/24HR 1 patch  1 patch TransDERmal Daily Fercho Segrua MD        methadone (DOLOPHINE) tablet 5 mg  5 mg Oral 3 times per day Fercho Segura MD   5 mg at 11/19/22 2137    morphine (PF) injection 2 mg  2 mg IntraVENous Q1H PRN Fercho Segura MD        mirtazapine (REMERON) tablet 15 mg  15 mg Oral Nightly Fercho Segura MD   15 mg at 11/19/22 2138    metoprolol succinate (TOPROL XL) extended release tablet 50 mg  50 mg Oral Daily Fercho Segura MD        QUEtiapine (SEROQUEL) tablet 25 mg  25 mg Oral Q6H PRN Fercho Segura MD        sennosides-docusate sodium (SENOKOT-S) 8.6-50 MG tablet 2 tablet  2 tablet Oral BID Fercho Segura MD   2 tablet at 11/19/22 2137       Allergies:     Allergies   Allergen Reactions    Lyrica [Pregabalin]      Generic Form       Problem List:    Patient Active Problem List   Diagnosis Code    Back pain M54.9    GI bleed K92.2    Moderate protein-calorie malnutrition (HCC) E44.0    Chronic pain syndrome G89.4    Chronic, continuous use of opioids F11.90    Severe scoliosis--Lumbar spine M41.9    Tobacco use Z72.0    Multilevel degenerative disc disease--L spine M53.9    Acute encephalopathy G93.40    Acute hypernatremia E87.0    Acute dehydration E86.0    Altered mental status R41.82    Hypotension I95.9    PRES (posterior reversible encephalopathy syndrome) I67.83    Degeneration of lumbar or lumbosacral intervertebral disc M51.37    Displaced fracture of left femoral neck (HCC) S72.002A    Hip fracture requiring operative repair, left, closed, initial encounter ([facility] [address] S72.002A       Past Medical History:        Diagnosis Date    Displaced fracture of left femoral neck ([facility] [address] 11/16/2022    Opiate use 6/27/2019    PRES (posterior reversible encephalopathy syndrome) 6/27/2019    Clinically improving s/p acute toxic-metabolic encephalopathy, hypotension, caloric malnutrition state    Scoliosis     Spinal stenosis        Past Surgical History:        Procedure Laterality Date    COLONOSCOPY N/A 4/14/2019    COLONOSCOPY DIAGNOSTIC performed by Edgard Scales MD at [address] NEEDLE BIOPSY LUNG PERCUTANEOUS  5/12/2022    CT NEEDLE BIOPSY LUNG PERCUTANEOUS 5/12/2022 [facility] CT    KNEE ARTHROSCOPY      UPPER GASTROINTESTINAL ENDOSCOPY N/A 4/14/2019    EGD ESOPHAGOGASTRODUODENOSCOPY performed by Edgard Scales [facility] at [facility] History:    Social History     Tobacco Use    Smoking status: Every Day     Packs/day: 1.00     Years: 30.00     Pack years: 30.00     Types: Cigarettes    Smokeless tobacco: Never   Substance Use Topics    Alcohol use:  No                                Ready to quit: Not Answered  Counseling given: Not Answered      Vital Signs (Current):   Vitals:    11/19/22 1419 11/19/22 1425 11/19/22 2130   BP:  127/63 105/75   Pulse:  72 79   Resp:  16 20   Temp:  98 °F (36.7 °C) 97.5 °F (36.4 °C)   TempSrc:   Oral   SpO2:  92% 98%   Weight: 140 lb (63.5 kg)     Height: 5' 1\" (1.549 m)                                                BP Readings from Last 3 Encounters:   11/19/22 105/75   11/15/22 (!) 153/100   05/12/22 117/76       NPO Status:                                                                                 BMI:   Wt Readings from Last 3 Encounters:   11/19/22 140 lb (63.5 kg)   11/15/22 140 lb (63.5 kg)   05/12/22 125 lb (56.7 kg)     Body mass index is 26.45 kg/m². CBC:   Lab Results   Component Value Date/Time    WBC 9.2 11/19/2022 03:02 PM    RBC 4.54 11/19/2022 03:02 PM    HGB 13.7 11/19/2022 03:02 PM    HCT 44.4 11/19/2022 03:02 PM    MCV 97.8 11/19/2022 03:02 PM    RDW 15.7 11/19/2022 03:02 PM     11/19/2022 03:02 PM       CMP:   Lab Results   Component Value Date/Time     11/19/2022 03:02 PM    K 3.2 11/19/2022 03:02 PM    CL 96 11/19/2022 03:02 PM    CO2 32 11/19/2022 03:02 PM    BUN 25 11/19/2022 03:02 PM    CREATININE 0.9 11/19/2022 03:02 PM    GFRAA >60 05/12/2022 07:20 AM    LABGLOM >60 11/19/2022 03:02 PM    GLUCOSE 114 11/19/2022 03:02 PM    PROT 8.1 12/17/2019 07:20 PM    CALCIUM 9.1 11/19/2022 03:02 PM    BILITOT 0.3 12/17/2019 07:20 PM    ALKPHOS 93 12/17/2019 07:20 PM    AST 20 12/17/2019 07:20 PM    ALT 15 12/17/2019 07:20 PM       POC Tests: No results for input(s): POCGLU, POCNA, POCK, POCCL, POCBUN, POCHEMO, POCHCT in the last 72 hours.     Coags:   Lab Results   Component Value Date/Time    PROTIME 10.9 05/12/2022 07:20 AM    INR 1.0 05/12/2022 07:20 AM       HCG (If Applicable): No results found for: PREGTESTUR, PREGSERUM, HCG, HCGQUANT     ABGs: No results found for: PHART, PO2ART, DLX9QAT, POT7FQV, BEART, G5ILNCZX     Type & Screen (If Applicable):  No results found for: LABABO, LABRH    Drug/Infectious Status (If Applicable):  No results found for: HIV, HEPCAB    COVID-19 Screening (If Applicable): No results found for: COVID19      XR:  Narrative   EXAMINATION:   ONE XRAY VIEW OF THE PELVIS AND TWO XRAY VIEWS LEFT HIP       11/15/2022 3:35 pm       COMPARISON:   None.       HISTORY:   ORDERING SYSTEM PROVIDED HISTORY: pain   TECHNOLOGIST PROVIDED HISTORY:   Reason for exam:->pain       FINDINGS:   Displaced left femoral neck fracture.  Femoral head maintains articulation   with the acetabulum.         Anesthesia Evaluation  Patient summary reviewed and Nursing notes reviewed no history of anesthetic complications:   Airway: Mallampati: III  TM distance: >3 FB   Neck ROM: full  Mouth opening: > = 3 FB   Dental:    (+) upper dentures      Pulmonary:   (+) decreased breath sounds: bilateral current smoker          Patient did not smoke on day of surgery. ROS comment: Lung nodule    CXR 5/2022:  FINDINGS:  No evidence of pneumothorax or lung hemorrhage post left lung biopsy.  The targeted lung nodule is again noted and is unchanged.  No new abnormal findings. PE comment: Coarse with PEP Cardiovascular:  Exercise tolerance: poor (<4 METS),   (+) ESCOBAR: no interval change,       ECG reviewed  Rhythm: regular  Rate: normal                 ROS comment: EKG 11/19/2022:  Normal sinus rhythm  Normal ECG  When compared with ECG of 09-AUG-2019 20:56,  T wave amplitude has decreased in Inferior leads  T wave inversion no longer evident in Anterior leads     Neuro/Psych:   (+) neuromuscular disease (Neuropathy):,              ROS comment: Chronic pain on hydrmorphone 8mg PO tablet + fentanyl patch  Cerebral aneurysm (See CTA head below)  Severe scoliosis lumbar spine  Multilevel DJD of L-spine  Spinal stenosis  PRES (posterior reversible encephalopathy syndrome)    CT C-spine 9/2021:  1.  No fracture or acute osseous abnormality.   2.  Multilevel degenerative changes. CTA head 12/2019:  1. 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 Follow-up catheter based angiogram could be helpful for further evaluation.   2. No evidence of arterial stenosis at level of the [place]. GI/Hepatic/Renal:            ROS comment: History of GI bleed. Endo/Other:    (+) malignancy/cancer (Lung CA (SCC) with metastases to bone  ).           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 Other Findings: Shortened externally left leg          Anesthesia Plan      general     ASA 4     (Modified RSI with HOB at 30 degrees RT  Pre-oxygenation x 3 minutes  20mg Ketamine  PONV prophylaxis)  Induction: intravenous. MIPS: Postoperative opioids intended and Prophylactic antiemetics administered. Anesthetic plan and risks discussed with patient. Plan discussed with CRNA. Preoperative evaluation note updated on 11/20/2022 based on review of patient's medical records. Patient to be evaluated in person by anesthesiologist on day of procedure. Homero Boyce MD   11/20/2022    DOS STAFF ADDENDUM:    Patient seen and chart reviewed on DOS. No interval change in history or exam.   I agree with the anesthesia pre-operative assessment written above and have made the appropriate addendums and/or changes. Anesthesia plan discussed and risks/benefits addressed. Patient's concerns and questions answered. NPO >8 hours. Note, the patient wishes to leave her DNR intact for the intended procedure.   Patient seen and evaluated EVI Sims - CRNA    Darius Ying DO  November 20, 2022  12:06 PM

## 2022-11-21 ENCOUNTER — TELEPHONE (OUTPATIENT)
Dept: ORTHOPEDIC SURGERY | Age: 62
End: 2022-11-21

## 2022-11-21 VITALS
WEIGHT: 140 LBS | OXYGEN SATURATION: 96 % | SYSTOLIC BLOOD PRESSURE: 121 MMHG | HEIGHT: 61 IN | DIASTOLIC BLOOD PRESSURE: 78 MMHG | RESPIRATION RATE: 16 BRPM | TEMPERATURE: 98.1 F | HEART RATE: 85 BPM | BODY MASS INDEX: 26.43 KG/M2

## 2022-11-21 LAB
ANION GAP SERPL CALCULATED.3IONS-SCNC: 9 MMOL/L (ref 7–16)
BASOPHILS ABSOLUTE: 0.01 E9/L (ref 0–0.2)
BASOPHILS RELATIVE PERCENT: 0.1 % (ref 0–2)
BUN BLDV-MCNC: 20 MG/DL (ref 6–23)
CALCIUM SERPL-MCNC: 8.7 MG/DL (ref 8.6–10.2)
CHLORIDE BLD-SCNC: 103 MMOL/L (ref 98–107)
CO2: 29 MMOL/L (ref 22–29)
CREAT SERPL-MCNC: 0.9 MG/DL (ref 0.5–1)
EOSINOPHILS ABSOLUTE: 0.02 E9/L (ref 0.05–0.5)
EOSINOPHILS RELATIVE PERCENT: 0.2 % (ref 0–6)
GFR SERPL CREATININE-BSD FRML MDRD: >60 ML/MIN/1.73
GLUCOSE BLD-MCNC: 71 MG/DL (ref 74–99)
HCT VFR BLD CALC: 42.7 % (ref 34–48)
HEMOGLOBIN: 13.1 G/DL (ref 11.5–15.5)
IMMATURE GRANULOCYTES #: 0.04 E9/L
IMMATURE GRANULOCYTES %: 0.5 % (ref 0–5)
LYMPHOCYTES ABSOLUTE: 2.24 E9/L (ref 1.5–4)
LYMPHOCYTES RELATIVE PERCENT: 27.6 % (ref 20–42)
MCH RBC QN AUTO: 30.2 PG (ref 26–35)
MCHC RBC AUTO-ENTMCNC: 30.7 % (ref 32–34.5)
MCV RBC AUTO: 98.4 FL (ref 80–99.9)
MONOCYTES ABSOLUTE: 0.44 E9/L (ref 0.1–0.95)
MONOCYTES RELATIVE PERCENT: 5.4 % (ref 2–12)
MRSA CULTURE ONLY: NORMAL
NEUTROPHILS ABSOLUTE: 5.37 E9/L (ref 1.8–7.3)
NEUTROPHILS RELATIVE PERCENT: 66.2 % (ref 43–80)
PDW BLD-RTO: 15.5 FL (ref 11.5–15)
PLATELET # BLD: 230 E9/L (ref 130–450)
PMV BLD AUTO: 9.5 FL (ref 7–12)
POTASSIUM REFLEX MAGNESIUM: 3.9 MMOL/L (ref 3.5–5)
RBC # BLD: 4.34 E12/L (ref 3.5–5.5)
SODIUM BLD-SCNC: 141 MMOL/L (ref 132–146)
WBC # BLD: 8.1 E9/L (ref 4.5–11.5)

## 2022-11-21 PROCEDURE — 36415 COLL VENOUS BLD VENIPUNCTURE: CPT

## 2022-11-21 PROCEDURE — 85025 COMPLETE CBC W/AUTO DIFF WBC: CPT

## 2022-11-21 PROCEDURE — 6360000002 HC RX W HCPCS: Performed by: STUDENT IN AN ORGANIZED HEALTH CARE EDUCATION/TRAINING PROGRAM

## 2022-11-21 PROCEDURE — 2580000003 HC RX 258: Performed by: STUDENT IN AN ORGANIZED HEALTH CARE EDUCATION/TRAINING PROGRAM

## 2022-11-21 PROCEDURE — 6370000000 HC RX 637 (ALT 250 FOR IP): Performed by: STUDENT IN AN ORGANIZED HEALTH CARE EDUCATION/TRAINING PROGRAM

## 2022-11-21 PROCEDURE — 80048 BASIC METABOLIC PNL TOTAL CA: CPT

## 2022-11-21 PROCEDURE — 99239 HOSP IP/OBS DSCHRG MGMT >30: CPT | Performed by: INTERNAL MEDICINE

## 2022-11-21 RX ORDER — LORAZEPAM 2 MG/ML
1 INJECTION INTRAMUSCULAR EVERY 4 HOURS PRN
Status: DISCONTINUED | OUTPATIENT
Start: 2022-11-21 | End: 2022-11-21 | Stop reason: HOSPADM

## 2022-11-21 RX ORDER — ASPIRIN 325 MG
325 TABLET, DELAYED RELEASE (ENTERIC COATED) ORAL 2 TIMES DAILY
Qty: 30 TABLET | Refills: 0 | Status: SHIPPED | OUTPATIENT
Start: 2022-11-21 | End: 2022-12-21

## 2022-11-21 RX ADMIN — HYDROMORPHONE HYDROCHLORIDE 1 MG: 1 INJECTION, SOLUTION INTRAMUSCULAR; INTRAVENOUS; SUBCUTANEOUS at 11:58

## 2022-11-21 RX ADMIN — SODIUM CHLORIDE, PRESERVATIVE FREE 10 ML: 5 INJECTION INTRAVENOUS at 08:43

## 2022-11-21 RX ADMIN — METHADONE HYDROCHLORIDE 5 MG: 5 TABLET ORAL at 10:32

## 2022-11-21 RX ADMIN — MORPHINE SULFATE 2 MG: 2 INJECTION, SOLUTION INTRAMUSCULAR; INTRAVENOUS at 06:35

## 2022-11-21 RX ADMIN — DOCUSATE SODIUM 50 MG AND SENNOSIDES 8.6 MG 2 TABLET: 8.6; 5 TABLET, FILM COATED ORAL at 08:38

## 2022-11-21 RX ADMIN — WATER 2000 MG: 1 INJECTION INTRAMUSCULAR; INTRAVENOUS; SUBCUTANEOUS at 06:38

## 2022-11-21 RX ADMIN — MORPHINE SULFATE 2 MG: 2 INJECTION, SOLUTION INTRAMUSCULAR; INTRAVENOUS at 02:26

## 2022-11-21 RX ADMIN — ENOXAPARIN SODIUM 40 MG: 100 INJECTION SUBCUTANEOUS at 08:38

## 2022-11-21 RX ADMIN — METHADONE HYDROCHLORIDE 5 MG: 5 TABLET ORAL at 03:20

## 2022-11-21 RX ADMIN — HYDROMORPHONE HYDROCHLORIDE 1 MG: 1 INJECTION, SOLUTION INTRAMUSCULAR; INTRAVENOUS; SUBCUTANEOUS at 08:38

## 2022-11-21 RX ADMIN — MORPHINE SULFATE 2 MG: 2 INJECTION, SOLUTION INTRAMUSCULAR; INTRAVENOUS at 04:26

## 2022-11-21 ASSESSMENT — PAIN DESCRIPTION - DESCRIPTORS
DESCRIPTORS: ACHING;DISCOMFORT
DESCRIPTORS: ACHING;DISCOMFORT;DULL
DESCRIPTORS: ACHING;DISCOMFORT;DULL
DESCRIPTORS: ACHING;DISCOMFORT
DESCRIPTORS: ACHING;DISCOMFORT
DESCRIPTORS: ACHING;DISCOMFORT;CRAMPING

## 2022-11-21 ASSESSMENT — PAIN DESCRIPTION - LOCATION
LOCATION: HIP

## 2022-11-21 ASSESSMENT — PAIN DESCRIPTION - PAIN TYPE
TYPE: ACUTE PAIN;SURGICAL PAIN
TYPE: ACUTE PAIN;SURGICAL PAIN

## 2022-11-21 ASSESSMENT — PAIN DESCRIPTION - FREQUENCY
FREQUENCY: CONTINUOUS

## 2022-11-21 ASSESSMENT — PAIN DESCRIPTION - ORIENTATION
ORIENTATION: LEFT

## 2022-11-21 ASSESSMENT — PAIN DESCRIPTION - ONSET
ONSET: ON-GOING

## 2022-11-21 ASSESSMENT — PAIN SCALES - GENERAL
PAINLEVEL_OUTOF10: 7
PAINLEVEL_OUTOF10: 6
PAINLEVEL_OUTOF10: 7
PAINLEVEL_OUTOF10: 7
PAINLEVEL_OUTOF10: 5
PAINLEVEL_OUTOF10: 10
PAINLEVEL_OUTOF10: 6
PAINLEVEL_OUTOF10: 7

## 2022-11-21 ASSESSMENT — PAIN - FUNCTIONAL ASSESSMENT
PAIN_FUNCTIONAL_ASSESSMENT: PREVENTS OR INTERFERES SOME ACTIVE ACTIVITIES AND ADLS

## 2022-11-21 NOTE — PROGRESS NOTES
Visit made, pt known to our services. Pt approved for hospice house transfer for [facility] level of care. [facility] to transport at noon. Consents signed at hospice house. Nurse to nurse called. Bedside advised to leave IV and faria in place.

## 2022-11-21 NOTE — CARE COORDINATION
Met with patient and family. POD#1 left hip hemiarthroplasty. Pt end stage ca, DNR-CC. Came from hospice house and they want to return today. Called [facility] and await call back from liaison.  Will follow-mjo

## 2022-11-21 NOTE — PROGRESS NOTES
Physical Therapy  PT orders received. Pt's family request no PT as they are hoping to discharge to Hospice.

## 2022-11-21 NOTE — PROGRESS NOTES
Physician Progress Note      PATIENT:               Kathe Greene  CSN #:                  [id number]  :                       1960  ADMIT DATE:       2022 2:05 PM  [address] [place] DATE:        2022 1:25 PM  RESPONDING  PROVIDER #:        Sulema Guadalupe DO          QUERY TEXT:    Pt admitted with left femur fracture. Pt noted to have Bone mets. If possible,   please document in progress notes and discharge summary if you are evaluating   and/or treating any of the following: The medical record reflects the following:  Risk Factors: Lung CA with bone mets, fall  Clinical Indicators: Pt admitted after fall from [facility] for left femur   fracture, pt has Lung CA with bone mets. Per ED \"from the hospice house for repair of a left hip fracture that is   secondary to a pathologic fracture related to metastatic lung cancer. \"  Treatment: left hip hemiarthroplasty    Thank you,  Daryn Lopez RN, CCDS  Clinical Documentation Improvement Specialist  [email or web address]  Options provided:  -- Pathological left femur fracture due to bone mets  -- Traumatic left femur fracture  -- Other - I will add my own diagnosis  -- Disagree - Not applicable / Not valid  -- Disagree - Clinically unable to determine / Unknown  -- Refer to Clinical Documentation Reviewer    PROVIDER RESPONSE TEXT:    This patient has a traumatic left femur fracture.     Query created by: Cortez Dow on 2022 1:25 PM      Electronically signed by:  Sulema Guadalupe DO 2022 4:12 PM

## 2022-11-21 NOTE — TELEPHONE ENCOUNTER
NP from [facility] called to clarify order for ASA. Lt Hip Hemiarthroplasty 11/20/2022. Per Orthopedic Discharge Summary:  mg 1 bid x 30 days.   Copy of discharge  summary fax 'd to [facility] @ [phone]

## 2022-11-21 NOTE — PROGRESS NOTES
Consult. redness to buttocks, skin tear to left arm  Awake,  present. Refusing to be turned. Left upper arm, foam dressing in place. Will not remove due to increase in skin tearing  Transferring to hospice at [facility] [place].  Lanie Garibay, CNS, Wound Care

## 2022-11-21 NOTE — DISCHARGE INSTRUCTIONS
[facility]) Department of Orthopedic Surgery  [address] [place] [address]    Dr. rEinn Elena, DO    Orthopaedics Discharge Instructions   Weight bearing Status - Weight bearing as tolerated - on left lower Extremity with anterior hip precautions as described by physical therapy  Pain medication Per Prescriptions  Contact Office for Medication Refill- [phone]    If patient discharging to facility then pain control will be continued per facility physician  Ice to operative/injured site for 15-30 minutes of each hour for next 5 days    Recommend that you continue to ice the area 2-3 times per day after this   Elevate operative/injured limb on 2 pillows at home  Goal is to have limb above the heart if able  Continue DVT Prophylaxis (blood clot prevention) as Prescribed: Aspirin 325 mg twice daily for 30 days  Wound care -okay to shower over top of your dressing. Please leave dressing in place for 7 days. After 7 days it is okay to remove your dressing. Please do not peel the glue off your incision. It is okay to shower over top of the incision directly after 7 days. Please replace with a clean dry dressing    Follow Up in Office in 2 weeks. Call the office at [phone] for directions or with any questions. Watch for these significant complications. Call physician if they or any other problems occur:  Fever over 101°, redness, swelling or warmth at the operative site  Unrelieved nausea    Foul smelling or cloudy drainage at the operative site   Unrelieved pain    Blood soaked dressing. (Some oozing may be normal)     Numb, pale, blue, cold or tingling extremity    No future appointments. It is the Department of Orthopaedic Trauma's standard of practice that providers will de-escalate(wean) all patients from narcotic(opioid) medications during the post-operative period. We provide multimodal pain control but opioid medications are tapered in all of our patients.   If patient requires referral to pain management for prolonged taper off of opioid pain medication we will facilitate this process.

## 2022-11-21 NOTE — DISCHARGE SUMMARY
[facility] Physician Discharge Summary       Camila Robert DO  [address]  [place] 72 [phone]    Schedule an appointment as soon as possible for a visit in 1 week(s)        Activity level: As tolerated     Dispo: Hospice house      Condition on discharge: Gaurded    Patient ID:  Joanna Castro  [id number]  60 y.o.  1960    Admit date: 11/19/2022    Discharge date and time:  11/21/2022  12:45 PM    Admission Diagnoses: Principal Problem:    Hip fracture requiring operative repair, left, closed, initial encounter [facility])  Resolved Problems:    * No resolved hospital problems. *      Discharge Diagnoses: Principal Problem:    Hip fracture requiring operative repair, left, closed, initial encounter [facility])  Resolved Problems:    * No resolved hospital problems. *      Consults:  IP CONSULT TO ORTHOPEDIC SURGERY  IP CONSULT TO HOSPICE    Procedures: LEFT HIP HEMIARTHROPLASTY    Hospital Course:   Patient Joanna Castro is a 60 y.o. presented with Closed fracture of left hip, initial encounter ([email or web address] [S72.002A]  Hip fracture requiring operative repair, left, closed, initial encounter [facility]) [S72.002A]    69year old female status post fall. Patient was found to have Closed displaced left femoral neck fracture. Patient was seen by orthopedic surgery. She had LEFT HIP HEMIARTHROPLASTY. Patient is DNR CC with hospice and wants to be discharged to hospice house. Did not want PT/OT.     Discharge Exam:    General Appearance: alert and oriented to person, place and time and in no acute distress  Skin: warm and dry  Head: normocephalic and atraumatic  Eyes: pupils equal, round, and reactive to light, extraocular eye movements intact, conjunctivae normal  Neck: neck supple and non tender without mass   Pulmonary/Chest: clear to auscultation bilaterally- no wheezes, rales or rhonchi, normal air movement, no respiratory distress  Cardiovascular: normal rate, normal S1 and S2 and no carotid bruits  Abdomen: soft, non-tender, non-distended, normal bowel sounds, no masses or organomegaly  Extremities: no cyanosis, no clubbing and no edema, left hip surgery  Neurologic: no cranial nerve deficit and speech normal    I/O last 3 completed shifts: In: 900 [I.V.:900]  Out: 1400 [Urine:1350; Blood:50]  No intake/output data recorded. LABS:  Recent Labs     11/19/22  1502 11/20/22  0300 11/21/22  0640    142 141   K 3.2* 4.5 3.9   CL 96* 102 103   CO2 32* 33* 29   BUN 25* 25* 20   CREATININE 0.9 0.8 0.9   GLUCOSE 114* 87 71*   CALCIUM 9.1 9.1 8.7       Recent Labs     11/19/22  1502 11/20/22  0300 11/21/22  0640   WBC 9.2 8.7 8.1   RBC 4.54 4.44 4.34   HGB 13.7 13.7 13.1   HCT 44.4 43.6 42.7   MCV 97.8 98.2 98.4   MCH 30.2 30.9 30.2   MCHC 30.9* 31.4* 30.7*   RDW 15.7* 15.7* 15.5*    248 230   MPV 9.4 9.2 9.5       No results for input(s): POCGLU in the last 72 hours. Imaging:  XR HIP 2-3 VW W PELVIS LEFT    Result Date: 11/20/2022  EXAMINATION: ONE XRAY VIEW OF THE PELVIS AND TWO XRAY VIEWS LEFT HIP 11/20/2022 2:22 pm COMPARISON: None. HISTORY: ORDERING SYSTEM PROVIDED HISTORY: post op TECHNOLOGIST PROVIDED HISTORY: Reason for exam:->post op FINDINGS: Three views of the left hip were obtained. Note is made of a left hip prosthesis. There is anatomic alignment of the prosthesis. There are subacute healing fractures of the right left superior and inferior pubic rami. Status post total left hip arthroplasty. There is no hardware complication Subacute healing fractures of the right and left superior and inferior pubic rami. Patient Instructions:      Medication List        START taking these medications      aspirin 325 MG EC tablet  Take 1 tablet by mouth in the morning and at bedtime            CONTINUE taking these medications      diazePAM 5 MG tablet  Commonly known as: Valium  Take 1 tablet by mouth nightly AND 2 tablets every morning (before breakfast).  Do all this for 30 days.     fentaNYL 100 MCG/HR  Commonly known as: DURAGESIC  Place 2 patches onto the skin every 72 hours for 30 days. HYDROmorphone 8 MG tablet  Commonly known as: Dilaudid  Take 2.5 tablets by mouth every 3 hours as needed for Pain for up to 30 days. metoprolol succinate 50 MG extended release tablet  Commonly known as: TOPROL XL     * morphine sulfate 20 MG/ML concentrated oral solution  Take 1 mL by mouth every hour as needed for Pain for up to 30 days. * morphine sulfate 20 MG/ML concentrated oral solution  Take 1 mL by mouth every hour as needed for Pain for up to 30 days. pantoprazole 20 MG tablet  Commonly known as: Protonix  Take 2 tablets by mouth in the morning. Vitamin D3 50 MCG (2000 UT) Caps           * This list has 2 medication(s) that are the same as other medications prescribed for you. Read the directions carefully, and ask your doctor or other care provider to review them with you.                 STOP taking these medications      gabapentin 100 MG capsule  Commonly known as: NEURONTIN     pregabalin 50 MG capsule  Commonly known as: Lyrica               Where to Get Your Medications        You can get these medications from any pharmacy    Bring a paper prescription for each of these medications  aspirin 325 MG EC tablet           Note that 35 minutes was spent in preparing discharge papers, discussing discharge with patient, medication review, etc.    Signed:  Electronically signed by Radha Weems DO on 11/21/2022 at 12:45 PM

## 2022-11-23 DIAGNOSIS — R09.89 AIR HUNGER: ICD-10-CM

## 2022-11-23 DIAGNOSIS — C34.10 MALIGNANT NEOPLASM OF UPPER LOBE OF LUNG, UNSPECIFIED LATERALITY (HCC): ICD-10-CM

## 2022-11-23 DIAGNOSIS — F41.9 ANXIETY: ICD-10-CM

## 2022-11-23 DIAGNOSIS — G89.3 NEOPLASM RELATED PAIN: Primary | ICD-10-CM

## 2022-11-23 DIAGNOSIS — Z51.5 HOSPICE CARE: ICD-10-CM

## 2022-11-23 RX ORDER — DIAZEPAM 5 MG/1
5 TABLET ORAL EVERY 6 HOURS PRN
Qty: 60 TABLET | Refills: 1 | Status: SHIPPED | OUTPATIENT
Start: 2022-11-23 | End: 2022-11-25 | Stop reason: SDUPTHER

## 2022-11-23 RX ORDER — HYDROMORPHONE HYDROCHLORIDE 4 MG/1
4 TABLET ORAL
Qty: 60 TABLET | Refills: 0 | Status: SHIPPED | OUTPATIENT
Start: 2022-11-23 | End: 2022-11-25 | Stop reason: SDUPTHER

## 2022-11-23 RX ORDER — METHADONE HYDROCHLORIDE 10 MG/1
TABLET ORAL
Qty: 75 TABLET | Refills: 0 | Status: SHIPPED | OUTPATIENT
Start: 2022-11-23 | End: 2022-11-25 | Stop reason: SDUPTHER

## 2022-11-25 DIAGNOSIS — C34.10 MALIGNANT NEOPLASM OF UPPER LOBE OF LUNG, UNSPECIFIED LATERALITY (HCC): ICD-10-CM

## 2022-11-25 DIAGNOSIS — G89.3 NEOPLASM RELATED PAIN: ICD-10-CM

## 2022-11-25 DIAGNOSIS — Z51.5 HOSPICE CARE: ICD-10-CM

## 2022-11-25 DIAGNOSIS — R09.89 AIR HUNGER: ICD-10-CM

## 2022-11-25 DIAGNOSIS — F41.9 ANXIETY: ICD-10-CM

## 2022-11-25 RX ORDER — HYDROMORPHONE HYDROCHLORIDE 4 MG/1
4 TABLET ORAL
Qty: 60 TABLET | Refills: 0 | Status: SHIPPED | OUTPATIENT
Start: 2022-11-25 | End: 2022-12-25

## 2022-11-25 RX ORDER — METHADONE HYDROCHLORIDE 10 MG/1
TABLET ORAL
Qty: 75 TABLET | Refills: 0 | Status: SHIPPED | OUTPATIENT
Start: 2022-11-25 | End: 2022-12-25

## 2022-11-25 RX ORDER — DIAZEPAM 5 MG/1
5 TABLET ORAL EVERY 6 HOURS PRN
Qty: 30 TABLET | Refills: 0 | Status: SHIPPED | OUTPATIENT
Start: 2022-11-25 | End: 2022-12-25

## 2022-12-05 DIAGNOSIS — Z51.5 HOSPICE CARE: ICD-10-CM

## 2022-12-05 DIAGNOSIS — G89.3 NEOPLASM RELATED PAIN: ICD-10-CM

## 2022-12-05 DIAGNOSIS — R09.89 AIR HUNGER: ICD-10-CM

## 2022-12-05 DIAGNOSIS — C34.10 MALIGNANT NEOPLASM OF UPPER LOBE OF LUNG, UNSPECIFIED LATERALITY (HCC): ICD-10-CM

## 2022-12-05 RX ORDER — HYDROMORPHONE HYDROCHLORIDE 4 MG/1
4 TABLET ORAL
Qty: 60 TABLET | Refills: 0 | Status: SHIPPED | OUTPATIENT
Start: 2022-12-05 | End: 2023-01-04

## 2022-12-05 RX ORDER — METHADONE HYDROCHLORIDE 10 MG/1
TABLET ORAL
Qty: 75 TABLET | Refills: 0 | Status: SHIPPED | OUTPATIENT
Start: 2022-12-05 | End: 2023-01-04

## 2022-12-07 ENCOUNTER — TELEPHONE (OUTPATIENT)
Dept: ORTHOPEDIC SURGERY | Age: 62
End: 2022-12-07

## 2022-12-07 NOTE — TELEPHONE ENCOUNTER
12/07/2022 9:20 am Sirisha Madden,  phoned the office / Ino castellano/ and informed Suzy Schwab, FOS: Cancel patient's appointment for today. Patient is now home. Condition has worsened. Patient is in [address] [place] [address]. The nurse will remove the staples left hip incision.

## 2022-12-21 DIAGNOSIS — R11.0 NAUSEA: Primary | ICD-10-CM

## 2022-12-21 DIAGNOSIS — Z51.5 HOSPICE CARE: ICD-10-CM

## 2022-12-21 DIAGNOSIS — G89.3 NEOPLASM RELATED PAIN: ICD-10-CM

## 2022-12-21 DIAGNOSIS — C34.10 MALIGNANT NEOPLASM OF UPPER LOBE OF LUNG, UNSPECIFIED LATERALITY (HCC): ICD-10-CM

## 2022-12-21 RX ORDER — ONDANSETRON 4 MG/1
4 TABLET, FILM COATED ORAL EVERY 6 HOURS PRN
Qty: 30 TABLET | Refills: 3 | Status: SHIPPED | OUTPATIENT
Start: 2022-12-21

## 2022-12-21 RX ORDER — METHADONE HYDROCHLORIDE 10 MG/1
TABLET ORAL
Qty: 75 TABLET | Refills: 0 | Status: SHIPPED | OUTPATIENT
Start: 2022-12-21 | End: 2023-01-20

## 2022-12-27 DIAGNOSIS — C34.10 MALIGNANT NEOPLASM OF UPPER LOBE OF LUNG, UNSPECIFIED LATERALITY (HCC): ICD-10-CM

## 2022-12-27 DIAGNOSIS — R09.89 AIR HUNGER: ICD-10-CM

## 2022-12-27 DIAGNOSIS — G89.3 NEOPLASM RELATED PAIN: ICD-10-CM

## 2022-12-27 DIAGNOSIS — Z51.5 HOSPICE CARE: ICD-10-CM

## 2022-12-27 RX ORDER — HYDROMORPHONE HYDROCHLORIDE 4 MG/1
4 TABLET ORAL
Qty: 60 TABLET | Refills: 0 | Status: SHIPPED | OUTPATIENT
Start: 2022-12-27 | End: 2022-12-29 | Stop reason: SDUPTHER

## 2022-12-29 DIAGNOSIS — R09.89 AIR HUNGER: ICD-10-CM

## 2022-12-29 DIAGNOSIS — G89.3 NEOPLASM RELATED PAIN: ICD-10-CM

## 2022-12-29 DIAGNOSIS — F41.9 ANXIETY: Primary | ICD-10-CM

## 2022-12-29 DIAGNOSIS — Z51.5 HOSPICE CARE PATIENT: ICD-10-CM

## 2022-12-29 DIAGNOSIS — Z51.5 HOSPICE CARE: ICD-10-CM

## 2022-12-29 DIAGNOSIS — C34.10 MALIGNANT NEOPLASM OF UPPER LOBE OF LUNG, UNSPECIFIED LATERALITY (HCC): ICD-10-CM

## 2022-12-29 RX ORDER — DIAZEPAM 5 MG/1
5 TABLET ORAL EVERY 6 HOURS PRN
Qty: 30 TABLET | Refills: 3 | Status: SHIPPED | OUTPATIENT
Start: 2022-12-29 | End: 2023-01-28

## 2022-12-29 RX ORDER — HYDROMORPHONE HYDROCHLORIDE 4 MG/1
4 TABLET ORAL
Qty: 60 TABLET | Refills: 0 | Status: SHIPPED | OUTPATIENT
Start: 2022-12-29 | End: 2023-01-28

## 2022-12-30 RX ORDER — HYDROMORPHONE HYDROCHLORIDE 4 MG/1
TABLET ORAL
Qty: 60 TABLET | Refills: 0 | OUTPATIENT
Start: 2022-12-30

## 2023-01-05 DIAGNOSIS — C34.10 MALIGNANT NEOPLASM OF UPPER LOBE OF LUNG, UNSPECIFIED LATERALITY (HCC): ICD-10-CM

## 2023-01-05 DIAGNOSIS — Z51.5 HOSPICE CARE: ICD-10-CM

## 2023-01-05 DIAGNOSIS — G89.3 NEOPLASM RELATED PAIN: ICD-10-CM

## 2023-01-05 RX ORDER — METHADONE HYDROCHLORIDE 10 MG/1
TABLET ORAL
Qty: 75 TABLET | Refills: 0 | Status: ON HOLD | OUTPATIENT
Start: 2023-01-05 | End: 2023-02-04

## 2023-01-06 DIAGNOSIS — F41.9 ANXIETY: ICD-10-CM

## 2023-01-06 DIAGNOSIS — Z51.5 HOSPICE CARE PATIENT: ICD-10-CM

## 2023-01-06 DIAGNOSIS — C34.10 MALIGNANT NEOPLASM OF UPPER LOBE OF LUNG, UNSPECIFIED LATERALITY (HCC): ICD-10-CM

## 2023-01-06 DIAGNOSIS — R09.89 AIR HUNGER: ICD-10-CM

## 2023-01-06 DIAGNOSIS — G89.3 NEOPLASM RELATED PAIN: ICD-10-CM

## 2023-01-06 DIAGNOSIS — Z51.5 HOSPICE CARE: ICD-10-CM

## 2023-01-06 RX ORDER — DIAZEPAM 5 MG/1
5 TABLET ORAL EVERY 6 HOURS PRN
Qty: 30 TABLET | Refills: 5 | Status: ON HOLD | OUTPATIENT
Start: 2023-01-06 | End: 2023-02-05

## 2023-01-06 RX ORDER — HYDROMORPHONE HYDROCHLORIDE 4 MG/1
4 TABLET ORAL
Qty: 60 TABLET | Refills: 0 | Status: ON HOLD | OUTPATIENT
Start: 2023-01-06 | End: 2023-02-05

## 2023-01-09 DIAGNOSIS — R09.89 AIR HUNGER: ICD-10-CM

## 2023-01-09 DIAGNOSIS — Z51.5 HOSPICE CARE PATIENT: ICD-10-CM

## 2023-01-09 DIAGNOSIS — Z51.5 HOSPICE CARE: ICD-10-CM

## 2023-01-09 DIAGNOSIS — C34.10 MALIGNANT NEOPLASM OF UPPER LOBE OF LUNG, UNSPECIFIED LATERALITY (HCC): ICD-10-CM

## 2023-01-09 DIAGNOSIS — G89.3 NEOPLASM RELATED PAIN: ICD-10-CM

## 2023-01-09 DIAGNOSIS — F41.9 ANXIETY: ICD-10-CM

## 2023-01-09 RX ORDER — DIAZEPAM 5 MG/1
5 TABLET ORAL EVERY 6 HOURS PRN
Qty: 30 TABLET | Refills: 5 | Status: ON HOLD | OUTPATIENT
Start: 2023-01-09 | End: 2023-02-08

## 2023-01-09 RX ORDER — QUETIAPINE FUMARATE 25 MG/1
25 TABLET, FILM COATED ORAL EVERY 4 HOURS PRN
Qty: 60 TABLET | Refills: 2 | Status: ON HOLD | OUTPATIENT
Start: 2023-01-09

## 2023-01-09 RX ORDER — HYDROMORPHONE HYDROCHLORIDE 4 MG/1
4 TABLET ORAL
Qty: 60 TABLET | Refills: 0 | Status: ON HOLD | OUTPATIENT
Start: 2023-01-09 | End: 2023-02-08

## 2023-01-09 RX ORDER — METHADONE HYDROCHLORIDE 10 MG/1
10 TABLET ORAL 3 TIMES DAILY
Qty: 90 TABLET | Refills: 0 | Status: ON HOLD | OUTPATIENT
Start: 2023-01-09 | End: 2023-02-08

## 2023-01-19 DIAGNOSIS — R09.89 AIR HUNGER: ICD-10-CM

## 2023-01-19 DIAGNOSIS — G89.3 NEOPLASM RELATED PAIN: ICD-10-CM

## 2023-01-19 DIAGNOSIS — Z51.5 HOSPICE CARE PATIENT: ICD-10-CM

## 2023-01-19 DIAGNOSIS — Z51.5 HOSPICE CARE: ICD-10-CM

## 2023-01-19 DIAGNOSIS — F41.9 ANXIETY: ICD-10-CM

## 2023-01-19 DIAGNOSIS — C34.10 MALIGNANT NEOPLASM OF UPPER LOBE OF LUNG, UNSPECIFIED LATERALITY (HCC): ICD-10-CM

## 2023-01-19 RX ORDER — DIAZEPAM 5 MG/1
7.5 TABLET ORAL EVERY 6 HOURS
Qty: 90 TABLET | Refills: 1 | Status: SHIPPED | OUTPATIENT
Start: 2023-01-19 | End: 2023-02-18

## 2023-01-19 RX ORDER — METHADONE HYDROCHLORIDE 10 MG/1
10 TABLET ORAL 3 TIMES DAILY
Qty: 90 TABLET | Refills: 0 | Status: SHIPPED | OUTPATIENT
Start: 2023-01-19 | End: 2023-02-18

## 2023-01-19 RX ORDER — HYDROMORPHONE HYDROCHLORIDE 2 MG/1
3 TABLET ORAL
Qty: 60 TABLET | Refills: 0 | Status: SHIPPED | OUTPATIENT
Start: 2023-01-19 | End: 2023-01-22

## 2023-01-22 DIAGNOSIS — C34.10 MALIGNANT NEOPLASM OF UPPER LOBE OF LUNG, UNSPECIFIED LATERALITY (HCC): ICD-10-CM

## 2023-01-22 DIAGNOSIS — G89.3 NEOPLASM RELATED PAIN: ICD-10-CM

## 2023-01-22 DIAGNOSIS — R09.89 AIR HUNGER: ICD-10-CM

## 2023-01-22 DIAGNOSIS — Z51.5 HOSPICE CARE: ICD-10-CM

## 2023-01-22 RX ORDER — HYDROMORPHONE HYDROCHLORIDE 2 MG/1
3 TABLET ORAL
Qty: 60 TABLET | Refills: 0 | Status: SHIPPED | OUTPATIENT
Start: 2023-01-22 | End: 2023-01-29

## 2023-02-02 DIAGNOSIS — Z51.5 HOSPICE CARE: ICD-10-CM

## 2023-02-02 DIAGNOSIS — G89.3 NEOPLASM RELATED PAIN: ICD-10-CM

## 2023-02-02 DIAGNOSIS — R09.89 AIR HUNGER: Primary | ICD-10-CM

## 2023-02-02 DIAGNOSIS — Z51.5 HOSPICE CARE PATIENT: ICD-10-CM

## 2023-02-02 DIAGNOSIS — F41.9 ANXIETY: ICD-10-CM

## 2023-02-02 DIAGNOSIS — C34.10 MALIGNANT NEOPLASM OF UPPER LOBE OF LUNG, UNSPECIFIED LATERALITY (HCC): ICD-10-CM

## 2023-02-02 RX ORDER — HYDROMORPHONE HYDROCHLORIDE 2 MG/1
3 TABLET ORAL
Qty: 60 TABLET | Refills: 0 | Status: SHIPPED | OUTPATIENT
Start: 2023-02-02 | End: 2023-03-04

## 2023-02-02 RX ORDER — DIAZEPAM 5 MG/1
7.5 TABLET ORAL EVERY 6 HOURS
Qty: 90 TABLET | Refills: 1 | Status: SHIPPED | OUTPATIENT
Start: 2023-02-02 | End: 2023-03-04

## 2023-02-02 RX ORDER — METHADONE HYDROCHLORIDE 10 MG/1
10 TABLET ORAL 3 TIMES DAILY
Qty: 90 TABLET | Refills: 0 | Status: SHIPPED | OUTPATIENT
Start: 2023-02-02 | End: 2023-03-04

## 2023-02-13 DIAGNOSIS — Z51.5 HOSPICE CARE PATIENT: ICD-10-CM

## 2023-02-13 DIAGNOSIS — G89.3 NEOPLASM RELATED PAIN: ICD-10-CM

## 2023-02-13 DIAGNOSIS — R09.89 AIR HUNGER: ICD-10-CM

## 2023-02-13 RX ORDER — HYDROMORPHONE HYDROCHLORIDE 2 MG/1
3 TABLET ORAL
Qty: 60 TABLET | Refills: 0 | Status: SHIPPED | OUTPATIENT
Start: 2023-02-13 | End: 2023-03-15

## 2023-07-17 NOTE — CARE COORDINATION
Social Work/Transition of Care:     Pt presented due to Fall, Hip Pain. Pt is from Home. KRISH met with pt introduced self and role. Per Ed PCP pt requests Hospice Services due to Dx of Cancer, KRISH provided [facility] of choice list and pt would like [place] called intake who will notify the Intake liaison order for pt to be assessed. Electronically signed by Carmela Ellis on 77/42/3080 at 4:13 PM     KRISH followed up with pt and caregiver at bedside who reported she was the [facility] [employer], KRISH obtained [employer] from [employer], registration was able to scan a copy into the medical record and her as pt primary contact. Pt was continually crying and reported increased pain, ED PCP notified. KRISH spoke with [facility] liaison who is aware of patient in the ED and requesting Hospice Services. Electronically signed by WASHINGTON Sawant on 62/24/0170 at 5:16 PM        Call received from [facility] stating in order for [facility] to begin services they must receive the revocation paperwork from [address] [place] [address] has spoken to the agency and they are supposed to be sending the information, KRISH will follow. Electronically signed by WASHINGTON Sawant on 07/02/5654 at 6:04 PM     Call received from [facility] stating revocation was never received and pt can not be admitted to [facility] this evening, [facility] will call pt and POA to update. Electronically signed by WASHINGTON Sawant on 74/15/7259 at 6:32 PM     KRISH met with pt and POA in order to follow up from [facility] conversation, Pt and POA requesting to be discharged home stating they have spoken to [facility] and pt will be admitted to their services tommorow and transfer to the [facility]. KRISH called [facility] for transport ETA 3 to 4 hours, family reports they will take pt home in the car ED PCP updated.     Electronically signed by Carmela Ellis on 81/21/4742 at 6:38 PM
Private car

## (undated) DEVICE — APPLICATOR PREP 26ML 0.7% IOD POVACRYLEX 74% ISO ALC ST

## (undated) DEVICE — ADHESIVE SKIN CLSR 0.7ML TOP DERMBND ADV

## (undated) DEVICE — TOWEL,OR,DSP,ST,BLUE,STD,6/PK,12PK/CS: Brand: MEDLINE

## (undated) DEVICE — STERILE PVP: Brand: MEDLINE INDUSTRIES, INC.

## (undated) DEVICE — DECANTER: Brand: UNBRANDED

## (undated) DEVICE — SOLUTION IV 500ML 0.9% SOD CHL PH 5 INJ USP VIAFLX PLAS

## (undated) DEVICE — SPONGE LAP W18XL18IN WHT COT 4 PLY FLD STRUNG RADPQ DISP ST

## (undated) DEVICE — SOLUTION IV IRRIG POUR BRL 0.9% SODIUM CHL 2F7124

## (undated) DEVICE — 3M™ STERI-DRAPE™ U-DRAPE 1015: Brand: STERI-DRAPE™

## (undated) DEVICE — TUBING, SUCTION, 9/32" X 10', STRAIGHT: Brand: MEDLINE

## (undated) DEVICE — COVER HNDL LT DISP

## (undated) DEVICE — 4-PORT MANIFOLD: Brand: NEPTUNE 2

## (undated) DEVICE — STRIP,CLOSURE,WOUND,MEDI-STRIP,1/2X4: Brand: MEDLINE

## (undated) DEVICE — TOTAL HIP PK

## (undated) DEVICE — 3M™ COBAN™ NL STERILE NON-LATEX SELF-ADHERENT WRAP, 2084S, 4 IN X 5 YD (10 CM X 4,5 M), 18 ROLLS/CASE: Brand: 3M™ COBAN™

## (undated) DEVICE — ELECTRODE PT RET AD L9FT HI MOIST COND ADH HYDRGEL CORDED

## (undated) DEVICE — PACK PROCEDURE SURG GEN CUST

## (undated) DEVICE — GOWN,SIRUS,POLYRNF,BRTHSLV,XL,30/CS: Brand: MEDLINE

## (undated) DEVICE — 2108 SERIES SAGITTAL BLADE, OFFSET (20.0 X 0.89 X 80.0MM)

## (undated) DEVICE — GLOVE ORANGE PI 8 1/2   MSG9085

## (undated) DEVICE — 3M™ STERI-DRAPE™ INCISE DRAPE 1050 (60CM X 45CM): Brand: STERI-DRAPE™

## (undated) DEVICE — PEEL-AWAY HOOD: Brand: FLYTE, SURGICOOL

## (undated) DEVICE — DRESSING HYDROFIBER AQUACEL AG ADVANTAGE 3.5X10 IN

## (undated) DEVICE — GOWN,SIRUS,FABRNF,XL,20/CS: Brand: MEDLINE

## (undated) DEVICE — HANDPIECE SET WITH COAXIAL HIGH FLOW TIP AND SUCTION TUBE: Brand: INTERPULSE

## (undated) DEVICE — GLOVE SURG SZ 85 L12IN FNGR ORTHO 126MIL CRM LTX FREE

## (undated) DEVICE — Device

## (undated) DEVICE — DOUBLE BASIN SET: Brand: MEDLINE INDUSTRIES, INC.